# Patient Record
Sex: MALE | Race: WHITE | NOT HISPANIC OR LATINO | Employment: FULL TIME | URBAN - METROPOLITAN AREA
[De-identification: names, ages, dates, MRNs, and addresses within clinical notes are randomized per-mention and may not be internally consistent; named-entity substitution may affect disease eponyms.]

---

## 2021-02-19 ENCOUNTER — OFFICE VISIT (OUTPATIENT)
Dept: FAMILY MEDICINE CLINIC | Facility: CLINIC | Age: 26
End: 2021-02-19

## 2021-02-19 VITALS
HEART RATE: 58 BPM | RESPIRATION RATE: 20 BRPM | SYSTOLIC BLOOD PRESSURE: 110 MMHG | TEMPERATURE: 98.3 F | HEIGHT: 70 IN | OXYGEN SATURATION: 98 % | DIASTOLIC BLOOD PRESSURE: 60 MMHG | BODY MASS INDEX: 18.18 KG/M2 | WEIGHT: 127 LBS

## 2021-02-19 DIAGNOSIS — R55 SYNCOPE, UNSPECIFIED SYNCOPE TYPE: ICD-10-CM

## 2021-02-19 DIAGNOSIS — R63.4 UNEXPLAINED WEIGHT LOSS: ICD-10-CM

## 2021-02-19 DIAGNOSIS — R56.9 NEW ONSET SEIZURE WITHOUT HEAD TRAUMA (HCC): Primary | ICD-10-CM

## 2021-02-19 DIAGNOSIS — F17.200 TOBACCO USE DISORDER: ICD-10-CM

## 2021-02-19 LAB
ALBUMIN SERPL-MCNC: 4.9 G/DL (ref 3.5–5.2)
ALBUMIN/GLOB SERPL: 2 G/DL
ALP SERPL-CCNC: 77 U/L (ref 39–117)
ALT SERPL-CCNC: 17 U/L (ref 1–41)
AST SERPL-CCNC: 18 U/L (ref 1–40)
BASOPHILS # BLD AUTO: 0.05 10*3/MM3 (ref 0–0.2)
BASOPHILS NFR BLD AUTO: 1.1 % (ref 0–1.5)
BILIRUB SERPL-MCNC: 0.5 MG/DL (ref 0–1.2)
BUN SERPL-MCNC: 15 MG/DL (ref 6–20)
BUN/CREAT SERPL: 19 (ref 7–25)
CALCIUM SERPL-MCNC: 9.7 MG/DL (ref 8.6–10.5)
CHLORIDE SERPL-SCNC: 101 MMOL/L (ref 98–107)
CO2 SERPL-SCNC: 29.8 MMOL/L (ref 22–29)
CREAT SERPL-MCNC: 0.79 MG/DL (ref 0.76–1.27)
EOSINOPHIL # BLD AUTO: 0.24 10*3/MM3 (ref 0–0.4)
EOSINOPHIL NFR BLD AUTO: 5.4 % (ref 0.3–6.2)
ERYTHROCYTE [DISTWIDTH] IN BLOOD BY AUTOMATED COUNT: 11.9 % (ref 12.3–15.4)
GLOBULIN SER CALC-MCNC: 2.4 GM/DL
GLUCOSE SERPL-MCNC: 78 MG/DL (ref 65–99)
HCT VFR BLD AUTO: 47.9 % (ref 37.5–51)
HGB BLD-MCNC: 16.4 G/DL (ref 13–17.7)
IMM GRANULOCYTES # BLD AUTO: 0.09 10*3/MM3 (ref 0–0.05)
IMM GRANULOCYTES NFR BLD AUTO: 2 % (ref 0–0.5)
LYMPHOCYTES # BLD AUTO: 0.92 10*3/MM3 (ref 0.7–3.1)
LYMPHOCYTES NFR BLD AUTO: 20.5 % (ref 19.6–45.3)
MCH RBC QN AUTO: 31.7 PG (ref 26.6–33)
MCHC RBC AUTO-ENTMCNC: 34.2 G/DL (ref 31.5–35.7)
MCV RBC AUTO: 92.5 FL (ref 79–97)
MONOCYTES # BLD AUTO: 0.32 10*3/MM3 (ref 0.1–0.9)
MONOCYTES NFR BLD AUTO: 7.1 % (ref 5–12)
NEUTROPHILS # BLD AUTO: 2.86 10*3/MM3 (ref 1.7–7)
NEUTROPHILS NFR BLD AUTO: 63.9 % (ref 42.7–76)
NRBC BLD AUTO-RTO: 0 /100 WBC (ref 0–0.2)
PLATELET # BLD AUTO: 186 10*3/MM3 (ref 140–450)
POTASSIUM SERPL-SCNC: 4.3 MMOL/L (ref 3.5–5.2)
PROT SERPL-MCNC: 7.3 G/DL (ref 6–8.5)
RBC # BLD AUTO: 5.18 10*6/MM3 (ref 4.14–5.8)
SODIUM SERPL-SCNC: 141 MMOL/L (ref 136–145)
T4 FREE SERPL-MCNC: 1.17 NG/DL (ref 0.93–1.7)
TSH SERPL DL<=0.005 MIU/L-ACNC: 1.22 UIU/ML (ref 0.27–4.2)
WBC # BLD AUTO: 4.48 10*3/MM3 (ref 3.4–10.8)

## 2021-02-19 PROCEDURE — 99204 OFFICE O/P NEW MOD 45 MIN: CPT | Performed by: NURSE PRACTITIONER

## 2021-02-19 NOTE — PROGRESS NOTES
"Chief Complaint  NP / establish PCP (last seen by Dr. Hussein ) and Seizures (pt thinks it was on Wed. )    Subjective          Isidro Santos presents to [facility]  History of Present Illness     NP establish care  syncopal episode  Thinks she had a seizure on Wednesday  Bharat he was sitting in chair and took a drink, feeling really thirsty, started getting dizzy, then started falling over, laying over FPC on, very pale   She was in the room saying his name, ridgid in arms, pushed them out, bharat sat him up, then he came to, confused for a while then went to bed. Did not go to ER for evaluation.    Denies Alcohol or drug use; heavy tobacco abuse, \"chain smoking\" tobacco and vaping 5% nicotine, 1.5-2 packs per day; not ready to quit, has never taken medication to quit  No Hx of seizures in the past  No recent head injury, no weakness, no vision changes  Has a hx of anxiety and depression, went to counselor a few times in high school   [employer] has a very physical job loading trucks,   Eats a lot of Ramen noodles, drinks a lot of soda.  Weight loss, 127 today 140 previous unexplained, not trying to lose weight    Objective      Vital Signs:   /60   Pulse 58   Temp 98.3 °F (36.8 °C)   Resp 20   Ht 177.8 cm (70\")   Wt 57.6 kg (127 lb)   SpO2 98%   BMI 18.22 kg/m²     Physical Exam  Vitals signs and nursing note reviewed.   Constitutional:       General: He is not in acute distress.     Appearance: Normal appearance. He is well-developed.   HENT:      Head: Normocephalic.      Right Ear: Tympanic membrane, ear canal and external ear normal.      Left Ear: Tympanic membrane, ear canal and external ear normal.      Nose: Nose normal. No mucosal edema or rhinorrhea.      Right Sinus: No maxillary sinus tenderness or frontal sinus tenderness.      Left Sinus: No maxillary sinus tenderness or frontal sinus tenderness.      Mouth/Throat:      Pharynx: Uvula midline. "   Eyes:      General: Lids are normal.      Conjunctiva/sclera: Conjunctivae normal.      Pupils: Pupils are equal, round, and reactive to light.   Neck:      Musculoskeletal: Normal range of motion and neck supple.      Thyroid: No thyroid mass or thyromegaly.      Vascular: No carotid bruit or JVD.      Trachea: Trachea normal.   Cardiovascular:      Rate and Rhythm: Normal rate and regular rhythm.      Heart sounds: Normal heart sounds, S1 normal and S2 normal.   Pulmonary:      Effort: Pulmonary effort is normal. No respiratory distress.      Breath sounds: Normal breath sounds.   Abdominal:      General: Bowel sounds are normal.      Palpations: Abdomen is soft.      Tenderness: There is no abdominal tenderness.   Musculoskeletal: Normal range of motion.   Lymphadenopathy:      Head:      Right side of head: No tonsillar, preauricular, posterior auricular or occipital adenopathy.      Left side of head: No tonsillar, preauricular, posterior auricular or occipital adenopathy.      Cervical: No cervical adenopathy.   Skin:     General: Skin is warm and dry.      Capillary Refill: Capillary refill takes less than 2 seconds.      Findings: No rash.      Nails: There is no clubbing.     Neurological:      Mental Status: He is alert and oriented to person, place, and time.      Cranial Nerves: No cranial nerve deficit.      Deep Tendon Reflexes: Reflexes are normal and symmetric. Reflexes normal.   Psychiatric:         Mood and Affect: Mood normal.         Speech: Speech normal.         Behavior: Behavior normal.         Thought Content: Thought content normal.         Judgment: Judgment normal.        Result Review :                 Assessment and Plan    Diagnoses and all orders for this visit:    1. New onset seizure without head trauma (CMS/HCC) (Primary)  -     TSH  -     CBC & Differential  -     Comprehensive Metabolic Panel  -     T4, Free  -     Ambulatory Referral to Neurology    2. Unexplained weight  loss  -     TSH  -     CBC & Differential  -     Comprehensive Metabolic Panel  -     T4, Free    3. Tobacco use disorder    4. Syncope, unspecified syncope type  -     Ambulatory Referral to Neurology        Follow Up   No follow-ups on file.  Patient was given instructions and counseling regarding his condition or for health maintenance advice. Please see specific information pulled into the AVS if appropriate.   Will check labs and place referral to Neurology for further evaluation. If have another seizure or syncopal episode needs to go to ER. Pt agrees.

## 2021-02-24 ENCOUNTER — TELEPHONE (OUTPATIENT)
Dept: FAMILY MEDICINE CLINIC | Facility: CLINIC | Age: 26
End: 2021-02-24

## 2021-02-24 RX ORDER — ONDANSETRON 4 MG/1
4 TABLET, ORALLY DISINTEGRATING ORAL EVERY 8 HOURS PRN
Qty: 20 TABLET | Refills: 0 | Status: SHIPPED | OUTPATIENT
Start: 2021-02-24 | End: 2021-08-09

## 2021-02-24 NOTE — TELEPHONE ENCOUNTER
PT JESSICA CALLED TO REQUEST A CT SCAN FOR PT, PT WAS SEEN IN THE ER LAST NIGHT FOR STOMACH PROBLEMS, PT HAS BEEN THROWING UP GREEN STUFF.    PLEASE ADVISE.  CALL BACK:[phone]

## 2021-08-03 ENCOUNTER — TELEPHONE (OUTPATIENT)
Dept: NEUROLOGY | Facility: CLINIC | Age: 26
End: 2021-08-03

## 2021-08-03 NOTE — TELEPHONE ENCOUNTER
Caller: RONA     Relationship: MOM PT GAVE VERBAL TO SPEAK WITH HER ON ALL MEDICAL AND APPT.     Best call back number:[phone]    What was the call regarding: YAMIL     Do you require a callback: YES     PT MOM CALLED TO SEE ABOUT GETTING A SOONER APPT. SHE IS A [employer] EMPLOYEE AND IS SCARED TO DEATH SINCE THIS IS ALL NEW . HE HAS NEVER HAD SEIZURES BEFORE. WOULD LIKE TO GET IN ASAP.     PLEASE CALL AND ADVISE.

## 2021-08-04 NOTE — TELEPHONE ENCOUNTER
Unfortunately right now we do not have any sooner appointments.  I would recommend adding him to the cancellation list for both providers in our clinic.  The other option would be to schedule him with one of the [facility] HALLIE's because they may have more availability.  It does appear that his seizure started in February and they actually canceled a February appointment with HALLIE Keller.  I would see if there is sooner availability at [facility] and if not he can go on a wait list for any cancellations.  Thanks, HALLIE Nicole.

## 2021-08-06 NOTE — TELEPHONE ENCOUNTER
Spoke to Mom and she did go ahead and take a sooner appt at [facility] with one of the other NP's.

## 2021-08-09 ENCOUNTER — OFFICE VISIT (OUTPATIENT)
Dept: NEUROLOGY | Facility: CLINIC | Age: 26
End: 2021-08-09

## 2021-08-09 ENCOUNTER — LAB (OUTPATIENT)
Dept: LAB | Facility: HOSPITAL | Age: 26
End: 2021-08-09

## 2021-08-09 VITALS
WEIGHT: 129.7 LBS | DIASTOLIC BLOOD PRESSURE: 88 MMHG | HEIGHT: 70 IN | BODY MASS INDEX: 18.57 KG/M2 | OXYGEN SATURATION: 98 % | SYSTOLIC BLOOD PRESSURE: 124 MMHG | HEART RATE: 79 BPM

## 2021-08-09 DIAGNOSIS — R56.9 NEW ONSET SEIZURE (HCC): ICD-10-CM

## 2021-08-09 DIAGNOSIS — R56.9 NEW ONSET SEIZURE (HCC): Primary | ICD-10-CM

## 2021-08-09 LAB
AMMONIA BLD-SCNC: 29 UMOL/L (ref 16–60)
BASOPHILS # BLD AUTO: 0.08 10*3/MM3 (ref 0–0.2)
BASOPHILS NFR BLD AUTO: 1.4 % (ref 0–1.5)
DEPRECATED RDW RBC AUTO: 39.8 FL (ref 37–54)
EOSINOPHIL # BLD AUTO: 0.32 10*3/MM3 (ref 0–0.4)
EOSINOPHIL NFR BLD AUTO: 5.7 % (ref 0.3–6.2)
ERYTHROCYTE [DISTWIDTH] IN BLOOD BY AUTOMATED COUNT: 12 % (ref 12.3–15.4)
HCT VFR BLD AUTO: 43.4 % (ref 37.5–51)
HGB BLD-MCNC: 14.9 G/DL (ref 13–17.7)
IMM GRANULOCYTES # BLD AUTO: 0.02 10*3/MM3 (ref 0–0.05)
IMM GRANULOCYTES NFR BLD AUTO: 0.4 % (ref 0–0.5)
LYMPHOCYTES # BLD AUTO: 1.82 10*3/MM3 (ref 0.7–3.1)
LYMPHOCYTES NFR BLD AUTO: 32.6 % (ref 19.6–45.3)
MCH RBC QN AUTO: 31.6 PG (ref 26.6–33)
MCHC RBC AUTO-ENTMCNC: 34.3 G/DL (ref 31.5–35.7)
MCV RBC AUTO: 92.1 FL (ref 79–97)
MONOCYTES # BLD AUTO: 0.45 10*3/MM3 (ref 0.1–0.9)
MONOCYTES NFR BLD AUTO: 8.1 % (ref 5–12)
NEUTROPHILS NFR BLD AUTO: 2.9 10*3/MM3 (ref 1.7–7)
NEUTROPHILS NFR BLD AUTO: 51.8 % (ref 42.7–76)
NRBC BLD AUTO-RTO: 0 /100 WBC (ref 0–0.2)
PLATELET # BLD AUTO: 196 10*3/MM3 (ref 140–450)
PMV BLD AUTO: 10.8 FL (ref 6–12)
RBC # BLD AUTO: 4.71 10*6/MM3 (ref 4.14–5.8)
WBC # BLD AUTO: 5.59 10*3/MM3 (ref 3.4–10.8)

## 2021-08-09 PROCEDURE — 82607 VITAMIN B-12: CPT

## 2021-08-09 PROCEDURE — 82140 ASSAY OF AMMONIA: CPT

## 2021-08-09 PROCEDURE — 36415 COLL VENOUS BLD VENIPUNCTURE: CPT

## 2021-08-09 PROCEDURE — 99214 OFFICE O/P EST MOD 30 MIN: CPT | Performed by: NURSE PRACTITIONER

## 2021-08-09 PROCEDURE — 84443 ASSAY THYROID STIM HORMONE: CPT

## 2021-08-09 PROCEDURE — 80053 COMPREHEN METABOLIC PANEL: CPT

## 2021-08-09 PROCEDURE — 85025 COMPLETE CBC W/AUTO DIFF WBC: CPT

## 2021-08-09 RX ORDER — LEVETIRACETAM 500 MG/1
500 TABLET ORAL 2 TIMES DAILY
Qty: 60 TABLET | Refills: 2 | Status: SHIPPED | OUTPATIENT
Start: 2021-08-09 | End: 2021-10-04 | Stop reason: SINTOL
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
## 2021-08-10 ENCOUNTER — TELEPHONE (OUTPATIENT)
Dept: NEUROLOGY | Facility: CLINIC | Age: 26
End: 2021-08-10

## 2021-08-10 LAB
ALBUMIN SERPL-MCNC: 4.5 G/DL (ref 3.5–5.2)
ALBUMIN/GLOB SERPL: 2 G/DL
ALP SERPL-CCNC: 74 U/L (ref 39–117)
ALT SERPL W P-5'-P-CCNC: 15 U/L (ref 1–41)
ANION GAP SERPL CALCULATED.3IONS-SCNC: 9.9 MMOL/L (ref 5–15)
AST SERPL-CCNC: 14 U/L (ref 1–40)
BILIRUB SERPL-MCNC: 0.3 MG/DL (ref 0–1.2)
BUN SERPL-MCNC: 13 MG/DL (ref 6–20)
BUN/CREAT SERPL: 18.3 (ref 7–25)
CALCIUM SPEC-SCNC: 9.3 MG/DL (ref 8.6–10.5)
CHLORIDE SERPL-SCNC: 101 MMOL/L (ref 98–107)
CO2 SERPL-SCNC: 27.1 MMOL/L (ref 22–29)
CREAT SERPL-MCNC: 0.71 MG/DL (ref 0.76–1.27)
GFR SERPL CREATININE-BSD FRML MDRD: 136 ML/MIN/1.73
GLOBULIN UR ELPH-MCNC: 2.3 GM/DL
GLUCOSE SERPL-MCNC: 55 MG/DL (ref 65–99)
POTASSIUM SERPL-SCNC: 4.1 MMOL/L (ref 3.5–5.2)
PROT SERPL-MCNC: 6.8 G/DL (ref 6–8.5)
SODIUM SERPL-SCNC: 138 MMOL/L (ref 136–145)
TSH SERPL DL<=0.05 MIU/L-ACNC: 2.41 UIU/ML (ref 0.27–4.2)
VIT B12 BLD-MCNC: 433 PG/ML (ref 211–946)

## 2021-08-10 NOTE — TELEPHONE ENCOUNTER
----- Message from Yamile Chester, NELLI, APRN sent at 8/10/2021 11:05 AM EDT -----  Please let him know his labs were normal except his glucose was slightly low. He needs to make sure he is eating healthy meals on a regular basis.

## 2021-09-07 ENCOUNTER — TELEPHONE (OUTPATIENT)
Dept: NEUROLOGY | Facility: CLINIC | Age: 26
End: 2021-09-07

## 2021-09-08 ENCOUNTER — HOSPITAL ENCOUNTER (OUTPATIENT)
Dept: NEUROLOGY | Facility: HOSPITAL | Age: 26
Discharge: HOME | End: 2021-09-08

## 2021-09-08 ENCOUNTER — HOSPITAL ENCOUNTER (OUTPATIENT)
Dept: MRI IMAGING | Facility: HOSPITAL | Age: 26
Discharge: HOME | End: 2021-09-08

## 2021-09-08 DIAGNOSIS — R56.9 NEW ONSET SEIZURE (HCC): ICD-10-CM

## 2021-09-08 PROCEDURE — A9577 INJ MULTIHANCE: HCPCS | Performed by: NURSE PRACTITIONER

## 2021-09-08 PROCEDURE — 0 GADOBENATE DIMEGLUMINE 529 MG/ML SOLUTION: Performed by: NURSE PRACTITIONER

## 2021-09-08 PROCEDURE — 95819 EEG AWAKE AND ASLEEP: CPT

## 2021-09-08 PROCEDURE — 70553 MRI BRAIN STEM W/O & W/DYE: CPT

## 2021-09-08 RX ADMIN — GADOBENATE DIMEGLUMINE 10 ML: 529 INJECTION, SOLUTION INTRAVENOUS at 07:18

## 2021-09-09 ENCOUNTER — TELEPHONE (OUTPATIENT)
Dept: NEUROLOGY | Facility: CLINIC | Age: 26
End: 2021-09-09

## 2021-09-09 DIAGNOSIS — R90.89 ABNORMAL FINDING ON MRI OF BRAIN: Primary | ICD-10-CM

## 2021-09-09 DIAGNOSIS — R56.9 NEW ONSET SEIZURE (HCC): ICD-10-CM

## 2021-09-09 NOTE — TELEPHONE ENCOUNTER
Left message for patient to call back regarding MRI brain and EEG. MRI brain with abnormal finding in grey matter. EEG consistent with seizure. Continue current meds at this time. Repeat MRI in 3 months. Keep appt in October.

## 2021-10-04 ENCOUNTER — OFFICE VISIT (OUTPATIENT)
Dept: NEUROLOGY | Facility: CLINIC | Age: 26
End: 2021-10-04

## 2021-10-04 VITALS
BODY MASS INDEX: 17.79 KG/M2 | TEMPERATURE: 97.7 F | HEART RATE: 67 BPM | DIASTOLIC BLOOD PRESSURE: 62 MMHG | OXYGEN SATURATION: 96 % | HEIGHT: 70 IN | WEIGHT: 124.3 LBS | SYSTOLIC BLOOD PRESSURE: 110 MMHG

## 2021-10-04 DIAGNOSIS — R56.9 NEW ONSET SEIZURE (HCC): Primary | ICD-10-CM

## 2021-10-04 PROCEDURE — 99214 OFFICE O/P EST MOD 30 MIN: CPT | Performed by: NURSE PRACTITIONER

## 2021-10-04 RX ORDER — DIVALPROEX SODIUM 500 MG/1
TABLET, EXTENDED RELEASE ORAL
Qty: 60 TABLET | Refills: 10 | Status: SHIPPED | OUTPATIENT
Start: 2021-10-04 | End: 2022-10-05 | Stop reason: SDUPTHER

## 2021-10-04 NOTE — PROGRESS NOTES
Neuro Office Visit      Encounter Date: 10/04/2021   Patient Name: Meet Santos  : 1995   MRN: [id number]   PCP: Dr Lance  Chief Complaint:    Chief Complaint   Patient presents with   • Seizures       History of Present Illness: Meet Santos is a 26 y.o. male who is here today in Neurology for Seizures     Last appt 21 w me-labs, eeg, mri, start Keppra    Labs-NCS except for low Glucose at 55.  MRI-IMPRESSION:MRI Brain With & Without Contrast (2021 07:42)    Abnormal nodular gray matter along the posterior body,  occipital horn and temporal horn of the left lateral ventricle  compatible with subependymal gray matter heterotopias. Findings likely  represent epileptogenic focus. No additional evidence of ischemia, mass,  mass effect or hemorrhage. No abnormal enhancement.    EEGEEG (Hospital Performed) (2021 08:30)      SUMMARY:     Intermittent left temporal slow     Ill-defined broad-based left temporal sharp waves     IMPRESSION:     This EEG shows evidence for left temporal dysfunction which appears mild     This finding, in combination with the sharp waves noted, can be seen in the setting of seizure disorders of the partial type    Sz  No further seizures. Bharat reports he jerks in the night, feels tired in the morning. Has been very irritable on Keppra. Would like to change medications.    Problem History  First seizure in 2021. Sitting at desk, felt dizzy, rigid and fell out of chair. No jerking. Eye rolling, no tongue biting. No bladder incontinence. Duration less than 5 minutes. [place] confused after ferreira. Did not go to ER  MRI: nodular lesions left post ventricle  EEG: Left temporal sharp waves    Subjective      Past Medical History:   Past Medical History:   Diagnosis Date   • Seizures (HCC)        Past Surgical History:   Past Surgical History:   Procedure Laterality Date   • NO PAST SURGERIES         Family History:   Family History   Problem Relation Age  of Onset   • No Known Problems Mother         RN   • Heart disease Father    • No Known Problems Brother    • Asthma Maternal Grandmother    • No Known Problems Maternal Grandfather        Social History:   Social History     Socioeconomic History   • Marital status: Single     Spouse name: Not on file   • Number of children: Not on file   • Years of education: Not on file   • Highest education level: Not on file   Tobacco Use   • Smoking status: Current Every Day Smoker     Years: 5.00   • Smokeless tobacco: Never Used   • Tobacco comment: vaping currently    Vaping Use   • Vaping Use: Every day   Substance and Sexual Activity   • Alcohol use: Yes     Alcohol/week: 6.0 standard drinks     Types: 6 Shots of liquor per week   • Drug use: Yes     Types: Marijuana   • Sexual activity: Defer       Medications:     Current Outpatient Medications:   •  divalproex (Depakote ER) 500 MG 24 hr tablet, Take one tablet by mouth at bedtime for 7 days, then take 2 tabs at bedtime as directed., Disp: 60 tablet, Rfl: 10    Allergies:   No Known Allergies    PHQ-9 Total Score:     STEADI Fall Risk Assessment has not been completed.    Objective     Physical Exam:   Physical Exam  Eyes:      Pupils: Pupils are equal, round, and reactive to light.   Neurological:      Mental Status: He is oriented to person, place, and time.      Coordination: Finger-Nose-Finger Test, Heel to Shin Test and Romberg Test normal.      Gait: Gait is intact.      Deep Tendon Reflexes:      Reflex Scores:       Tricep reflexes are 2+ on the right side and 2+ on the left side.       Bicep reflexes are 2+ on the right side and 2+ on the left side.       Brachioradialis reflexes are 2+ on the right side and 2+ on the left side.       Patellar reflexes are 2+ on the right side and 2+ on the left side.       Achilles reflexes are 2+ on the right side and 2+ on the left side.  Psychiatric:         Speech: Speech normal.         Neurologic Exam     Mental Status  "  Oriented to person, place, and time.   Follows 3 step commands.   Attention: normal. Concentration: normal.   Speech: speech is normal   Level of consciousness: alert  Knowledge: consistent with education.   Normal comprehension.     Cranial Nerves     CN III, IV, VI   Pupils are equal, round, and reactive to light.  Right pupil: Accommodation: intact.   Left pupil: Accommodation: intact.   CN III: no CN III palsy  CN VI: no CN VI palsy  Nystagmus: none   Diplopia: none  Upgaze: normal  Downgaze: normal  Conjugate gaze: present    CN VII   Facial expression full, symmetric.     CN VIII   Hearing: intact    CN XII   CN XII normal.   Occasional ptosis. Occurs bilat but in one eye at a time. Right cover uncover is +     Motor Exam   Muscle bulk: normal  Overall muscle tone: normal    Strength   Right biceps: 5/5  Left biceps: 5/5  Right triceps: 5/5  Left triceps: 5/5  Right interossei: 5/5  Left interossei: 5/5  Right quadriceps: 5/5  Left quadriceps: 5/5  Right anterior tibial: 5/5  Left anterior tibial: 5/5  Right posterior tibial: 5/5  Left posterior tibial: 5/5    Sensory Exam   Light touch normal.     Gait, Coordination, and Reflexes     Gait  Gait: normal    Coordination   Romberg: negative  Finger to nose coordination: normal  Heel to shin coordination: normal    Tremor   Resting tremor: absent  Intention tremor: present  Action tremor: absent    Reflexes   Right brachioradialis: 2+  Left brachioradialis: 2+  Right biceps: 2+  Left biceps: 2+  Right triceps: 2+  Left triceps: 2+  Right patellar: 2+  Left patellar: 2+  Right achilles: 2+  Left achilles: 2+  Right : 2+  Left : 2+       Vital Signs:   Vitals:    10/04/21 1008   BP: 110/62   Pulse: 67   Temp: 97.7 °F (36.5 °C)   SpO2: 96%   Weight: 56.4 kg (124 lb 4.8 oz)   Height: 177.8 cm (70\")     Body mass index is 17.84 kg/m².     Results:   Imaging:   EEG (Hospital Performed)    Result Date: 9/8/2021  This EEG shows evidence for left temporal " dysfunction which appears mild This finding, in combination with the sharp waves noted, can be seen in the setting of seizure disorders of the partial type This report is transcribed using the Dragon dictation system.      MRI Brain With & Without Contrast    Result Date: 9/8/2021  Abnormal nodular gray matter along the posterior body, occipital horn and temporal horn of the left lateral ventricle compatible with subependymal gray matter heterotopias. Findings likely represent epileptogenic focus. No additional evidence of ischemia, mass, mass effect or hemorrhage. No abnormal enhancement.   This report was finalized on 9/8/2021 9:12 AM by Roger Kennedy.         Assessment / Plan      Assessment/Plan:   Diagnoses and all orders for this visit:    1. New onset seizure (HCC) (Primary)  -     divalproex (Depakote ER) 500 MG 24 hr tablet; Take one tablet by mouth at bedtime for 7 days, then take 2 tabs at bedtime as directed.  Dispense: 60 tablet; Refill: 10     Will wean Keppra. Follow up for labs in 6 weeks. Call sooner prn.  I recommend he be off work until seizure free 90 days.    Patient Education:   I have instructed and given the patient education on the importance of not driving and operating heavy machinery. No solo bathing or tub baths for 3 months from onset of the most recent seizure.   Minimize stress as much as possible. I have recommended 7-8 hours of sleep each night. Abstain from alcohol intake. Educated on Antiepileptic medications with possible side effects and signs and symptoms to report if prescribed during visit. Instructed to take seizure medication daily if prescribed. Reviewed potential seizure risk factors.   I have instructed the patient to call 911 or our office if another seizure does occur. Patient verbalizes and understands.       Reviewed medications, potential side effects and signs and symptoms to report. Discussed risk versus benefits of treatment plan with patient and/or  family-including medications, labs and radiology that may be ordered. Addressed questions and concerns during visit. Patient and/or family verbalized understanding and agree with plan. Instructed to call the office with any questions and report to ER with any life-threatening symptoms.     Follow Up:   Return in about 6 weeks (around 11/15/2021) for Recheck.    During this visit the following were done:  Labs Reviewed [x]    Labs Ordered []    Radiology Reports Reviewed []    Radiology Ordered []    PCP Records Reviewed []    Referring Provider Records Reviewed []    ER Records Reviewed []    Hospital Records Reviewed []    History Obtained From Family []    Radiology Images Reviewed []    Other Reviewed [x]    Records Requested []      Yamile Chester, DNP, APRN

## 2021-11-15 ENCOUNTER — OFFICE VISIT (OUTPATIENT)
Dept: NEUROLOGY | Facility: CLINIC | Age: 26
End: 2021-11-15

## 2021-11-15 ENCOUNTER — LAB (OUTPATIENT)
Dept: LAB | Facility: HOSPITAL | Age: 26
End: 2021-11-15

## 2021-11-15 VITALS
TEMPERATURE: 98.6 F | OXYGEN SATURATION: 97 % | WEIGHT: 125.2 LBS | DIASTOLIC BLOOD PRESSURE: 72 MMHG | HEIGHT: 70 IN | BODY MASS INDEX: 17.92 KG/M2 | HEART RATE: 79 BPM | SYSTOLIC BLOOD PRESSURE: 116 MMHG

## 2021-11-15 DIAGNOSIS — G40.109 LOCALIZATION-RELATED SYMPTOMATIC EPILEPSY AND EPILEPTIC SYNDROMES WITH SIMPLE PARTIAL SEIZURES, NOT INTRACTABLE, WITHOUT STATUS EPILEPTICUS (HCC): Primary | ICD-10-CM

## 2021-11-15 DIAGNOSIS — R56.9 NEW ONSET SEIZURE (HCC): ICD-10-CM

## 2021-11-15 DIAGNOSIS — R90.89 ABNORMAL FINDING ON MRI OF BRAIN: ICD-10-CM

## 2021-11-15 LAB
ALBUMIN SERPL-MCNC: 5 G/DL (ref 3.5–5.2)
ALBUMIN/GLOB SERPL: 2.2 G/DL
ALP SERPL-CCNC: 74 U/L (ref 39–117)
ALT SERPL W P-5'-P-CCNC: 14 U/L (ref 1–41)
ANION GAP SERPL CALCULATED.3IONS-SCNC: 10.2 MMOL/L (ref 5–15)
AST SERPL-CCNC: 18 U/L (ref 1–40)
BASOPHILS # BLD AUTO: 0.07 10*3/MM3 (ref 0–0.2)
BASOPHILS NFR BLD AUTO: 0.9 % (ref 0–1.5)
BILIRUB SERPL-MCNC: 0.3 MG/DL (ref 0–1.2)
BUN SERPL-MCNC: 15 MG/DL (ref 6–20)
BUN/CREAT SERPL: 17.9 (ref 7–25)
CALCIUM SPEC-SCNC: 9.7 MG/DL (ref 8.6–10.5)
CHLORIDE SERPL-SCNC: 103 MMOL/L (ref 98–107)
CO2 SERPL-SCNC: 28.8 MMOL/L (ref 22–29)
CREAT SERPL-MCNC: 0.84 MG/DL (ref 0.76–1.27)
DEPRECATED RDW RBC AUTO: 41.6 FL (ref 37–54)
EOSINOPHIL # BLD AUTO: 0.32 10*3/MM3 (ref 0–0.4)
EOSINOPHIL NFR BLD AUTO: 4.3 % (ref 0.3–6.2)
ERYTHROCYTE [DISTWIDTH] IN BLOOD BY AUTOMATED COUNT: 12.1 % (ref 12.3–15.4)
GFR SERPL CREATININE-BSD FRML MDRD: 112 ML/MIN/1.73
GLOBULIN UR ELPH-MCNC: 2.3 GM/DL
GLUCOSE SERPL-MCNC: 76 MG/DL (ref 65–99)
HCT VFR BLD AUTO: 47.7 % (ref 37.5–51)
HGB BLD-MCNC: 16.2 G/DL (ref 13–17.7)
IMM GRANULOCYTES # BLD AUTO: 0.02 10*3/MM3 (ref 0–0.05)
IMM GRANULOCYTES NFR BLD AUTO: 0.3 % (ref 0–0.5)
LYMPHOCYTES # BLD AUTO: 2.2 10*3/MM3 (ref 0.7–3.1)
LYMPHOCYTES NFR BLD AUTO: 29.7 % (ref 19.6–45.3)
MCH RBC QN AUTO: 31.6 PG (ref 26.6–33)
MCHC RBC AUTO-ENTMCNC: 34 G/DL (ref 31.5–35.7)
MCV RBC AUTO: 93 FL (ref 79–97)
MONOCYTES # BLD AUTO: 0.74 10*3/MM3 (ref 0.1–0.9)
MONOCYTES NFR BLD AUTO: 10 % (ref 5–12)
NEUTROPHILS NFR BLD AUTO: 4.05 10*3/MM3 (ref 1.7–7)
NEUTROPHILS NFR BLD AUTO: 54.8 % (ref 42.7–76)
NRBC BLD AUTO-RTO: 0 /100 WBC (ref 0–0.2)
PLATELET # BLD AUTO: 188 10*3/MM3 (ref 140–450)
PMV BLD AUTO: 11.4 FL (ref 6–12)
POTASSIUM SERPL-SCNC: 5 MMOL/L (ref 3.5–5.2)
PROT SERPL-MCNC: 7.3 G/DL (ref 6–8.5)
RBC # BLD AUTO: 5.13 10*6/MM3 (ref 4.14–5.8)
SODIUM SERPL-SCNC: 142 MMOL/L (ref 136–145)
VALPROATE SERPL-MCNC: <2.8 MCG/ML (ref 50–125)
WBC # BLD AUTO: 7.4 10*3/MM3 (ref 3.4–10.8)

## 2021-11-15 PROCEDURE — 85025 COMPLETE CBC W/AUTO DIFF WBC: CPT

## 2021-11-15 PROCEDURE — 36415 COLL VENOUS BLD VENIPUNCTURE: CPT

## 2021-11-15 PROCEDURE — 80053 COMPREHEN METABOLIC PANEL: CPT

## 2021-11-15 PROCEDURE — 99214 OFFICE O/P EST MOD 30 MIN: CPT | Performed by: NURSE PRACTITIONER

## 2021-11-15 PROCEDURE — 80164 ASSAY DIPROPYLACETIC ACD TOT: CPT

## 2021-11-15 NOTE — PROGRESS NOTES
Neuro Office Visit      Encounter Date: 11/15/2021   Patient Name: Meet Santos  : 1995   MRN: [id number]     Chief Complaint:    Chief Complaint   Patient presents with   • Seizures       History of Present Illness: Meet Santos is a 26 y.o. male who is here today in Neurology for seizures.    Last appt 10/04/21 w me-Depakote ER 500mg. Wean Keppra. Repeat MRI in Dec 2021.    Sz  No Sz since last visit.  Had Seizure 2021. Weaned off of Keppra due to irritability. Taking Depakote. 500mg 2 q hs.        MRI Brain With & Without Contrast (2021 07:42)  IMPRESSION:  Abnormal nodular gray matter along the posterior body,  occipital horn and temporal horn of the left lateral ventricle  compatible with subependymal gray matter heterotopias. Findings likely  represent epileptogenic focus. No additional evidence of ischemia, mass,  mass effect or hemorrhage. No abnormal enhancement.      EEG (Hospital Performed) (2021 08:30)  IMPRESSION:  This EEG shows evidence for left temporal dysfunction which appears mild  This finding, in combination with the sharp waves noted, can be seen in the setting of seizure disorders of the partial type    Subjective      Past Medical History:   Past Medical History:   Diagnosis Date   • Seizures (HCC)        Past Surgical History:   Past Surgical History:   Procedure Laterality Date   • NO PAST SURGERIES         Family History:   Family History   Problem Relation Age of Onset   • No Known Problems Mother         RN   • Heart disease Father    • No Known Problems Brother    • Asthma Maternal Grandmother    • No Known Problems Maternal Grandfather        Social History:   Social History     Socioeconomic History   • Marital status: Single   Tobacco Use   • Smoking status: Current Every Day Smoker     Years: 5.00   • Smokeless tobacco: Never Used   • Tobacco comment: vaping currently    Vaping Use   • Vaping Use: Every day   Substance and Sexual  Activity   • Alcohol use: Yes     Alcohol/week: 6.0 standard drinks     Types: 6 Shots of liquor per week   • Drug use: Yes     Types: Marijuana   • Sexual activity: Defer       Medications:     Current Outpatient Medications:   •  divalproex (Depakote ER) 500 MG 24 hr tablet, Take one tablet by mouth at bedtime for 7 days, then take 2 tabs at bedtime as directed., Disp: 60 tablet, Rfl: 10    Allergies:   No Known Allergies    PHQ-9 Total Score:     STEADI Fall Risk Assessment has not been completed.    Objective     Physical Exam:   Physical Exam  Eyes:      Pupils: Pupils are equal, round, and reactive to light.   Neurological:      Mental Status: He is oriented to person, place, and time.      Coordination: Finger-Nose-Finger Test, Heel to Shin Test and Romberg Test normal.      Gait: Gait is intact.      Deep Tendon Reflexes:      Reflex Scores:       Tricep reflexes are 2+ on the right side and 2+ on the left side.       Bicep reflexes are 2+ on the right side and 2+ on the left side.       Brachioradialis reflexes are 2+ on the right side and 2+ on the left side.       Patellar reflexes are 2+ on the right side and 2+ on the left side.       Achilles reflexes are 2+ on the right side and 2+ on the left side.  Psychiatric:         Speech: Speech normal.         Neurologic Exam     Mental Status   Oriented to person, place, and time.   Follows 3 step commands.   Attention: normal. Concentration: normal.   Speech: speech is normal   Level of consciousness: alert  Knowledge: consistent with education.   Normal comprehension.     Cranial Nerves     CN III, IV, VI   Pupils are equal, round, and reactive to light.  Right pupil: Accommodation: intact.   Left pupil: Accommodation: intact.   CN III: no CN III palsy  CN VI: no CN VI palsy  Nystagmus: none   Diplopia: none  Upgaze: normal  Downgaze: normal  Conjugate gaze: present    CN VII   Facial expression full, symmetric.     CN VIII   Hearing: intact    CN XII   CN  "XII normal.     Motor Exam   Muscle bulk: normal  Overall muscle tone: normal    Strength   Right biceps: 5/5  Left biceps: 5/5  Right triceps: 5/5  Left triceps: 5/5  Right interossei: 5/5  Left interossei: 5/5  Right quadriceps: 5/5  Left quadriceps: 5/5  Right anterior tibial: 5/5  Left anterior tibial: 5/5  Right posterior tibial: 5/5  Left posterior tibial: 5/5    Sensory Exam   Light touch normal.     Gait, Coordination, and Reflexes     Gait  Gait: normal    Coordination   Romberg: negative  Finger to nose coordination: normal  Heel to shin coordination: normal    Tremor   Resting tremor: absent  Action tremor: absent    Reflexes   Right brachioradialis: 2+  Left brachioradialis: 2+  Right biceps: 2+  Left biceps: 2+  Right triceps: 2+  Left triceps: 2+  Right patellar: 2+  Left patellar: 2+  Right achilles: 2+  Left achilles: 2+  Right : 2+  Left : 2+       Vital Signs:   Vitals:    11/15/21 0908   BP: 116/72   Pulse: 79   Temp: 98.6 °F (37 °C)   TempSrc: Temporal   SpO2: 97%   Weight: 56.8 kg (125 lb 3.2 oz)   Height: 177.8 cm (70\")     Body mass index is 17.96 kg/m².     Results:   Imaging:   EEG (Hospital Performed)    Result Date: 9/8/2021  This EEG shows evidence for left temporal dysfunction which appears mild This finding, in combination with the sharp waves noted, can be seen in the setting of seizure disorders of the partial type This report is transcribed using the Dragon dictation system.      MRI Brain With & Without Contrast    Result Date: 9/8/2021  Abnormal nodular gray matter along the posterior body, occipital horn and temporal horn of the left lateral ventricle compatible with subependymal gray matter heterotopias. Findings likely represent epileptogenic focus. No additional evidence of ischemia, mass, mass effect or hemorrhage. No abnormal enhancement.   This report was finalized on 9/8/2021 9:12 AM by Roger Kennedy.         Assessment / Plan      Assessment/Plan:   Diagnoses " and all orders for this visit:    1. Localization-related symptomatic epilepsy and epileptic syndromes with simple partial seizures, not intractable, without status epilepticus (HCC) (Primary)  -     Valproic Acid Level, Total; Future  -     CBC Auto Differential; Future  -     Comprehensive Metabolic Panel; Future    2. Abnormal finding on MRI of brain  Comments:  MRI brain ordered to be repeated after 12/14/21.         Patient Education:     Reviewed medications, potential side effects and signs and symptoms to report. Discussed risk versus benefits of treatment plan with patient and/or family-including medications, labs and radiology that may be ordered. Addressed questions and concerns during visit. Patient and/or family verbalized understanding and agree with plan. Instructed to call the office with any questions and report to ER with any life-threatening symptoms.     Follow Up:   Return in about 6 weeks (around 12/27/2021) for Recheck.    During this visit the following were done:  Labs Reviewed [x]    Labs Ordered [x]    Radiology Reports Reviewed [x]    Radiology Ordered []    PCP Records Reviewed []    Referring Provider Records Reviewed []    ER Records Reviewed []    Hospital Records Reviewed []    History Obtained From Family []    Radiology Images Reviewed [x]    Other Reviewed []    Records Requested []      Yamile Chester, NELLI, APRN

## 2021-12-02 ENCOUNTER — TELEPHONE (OUTPATIENT)
Dept: NEUROLOGY | Facility: CLINIC | Age: 26
End: 2021-12-02

## 2021-12-02 NOTE — TELEPHONE ENCOUNTER
Anthony Worrell,  Under the communications in her referral for a repeat MRI of the brain it states when they called to schedule patient refused stating she had already had this test done and did not want to do it again.  However,  Now the grandmother is calling about getting it scheduled. I know you wrote repeat after 12/14/21 in your last office note, but before I call the Grandmother wanted to clarify that you wanted the repeat done towards the end of this month due to previous abnormality? Insurance may require it be 6 months in between but we can see when they do the authorization.

## 2021-12-02 NOTE — TELEPHONE ENCOUNTER
Provider: HALLIE RICHARD  Caller: TASHA HILL  Relationship to Patient: PT'S GRANDMOTHER        Reason for Call: PT'S GRANDMOTHER TASHA CALLING CHECKING ON TO SEE IF THE MRI BRAIN HAD BEEN SCHEDULED YET, CAUSE THEY HAVE NOT HEARD FROM NO ONE.      PLEASE CALL HER BACK [phone]

## 2021-12-03 NOTE — TELEPHONE ENCOUNTER
FYI: Reasoning for repeat sooner than 6 months:  New onset of seizures with abnormal MRI, recheck MRI/repeat in 3 months for possible surgical intervention.     S/w Grandmother, Pao who states he does want this done and really needs it as well. Transferred her over to [facility] scheduling and routed referral back to them so she can go ahead and get him put on for this. Thanks!

## 2022-01-02 ENCOUNTER — HOSPITAL ENCOUNTER (OUTPATIENT)
Dept: MRI IMAGING | Facility: HOSPITAL | Age: 27
Discharge: HOME | End: 2022-01-02
Admitting: NURSE PRACTITIONER

## 2022-01-02 DIAGNOSIS — R56.9 NEW ONSET SEIZURE: ICD-10-CM

## 2022-01-02 DIAGNOSIS — R90.89 ABNORMAL FINDING ON MRI OF BRAIN: ICD-10-CM

## 2022-01-02 PROCEDURE — 0 GADOBENATE DIMEGLUMINE 529 MG/ML SOLUTION: Performed by: NURSE PRACTITIONER

## 2022-01-02 PROCEDURE — A9577 INJ MULTIHANCE: HCPCS | Performed by: NURSE PRACTITIONER

## 2022-01-02 PROCEDURE — 70553 MRI BRAIN STEM W/O & W/DYE: CPT

## 2022-01-02 RX ADMIN — GADOBENATE DIMEGLUMINE 10 ML: 529 INJECTION, SOLUTION INTRAVENOUS at 08:24

## 2022-01-07 ENCOUNTER — TELEPHONE (OUTPATIENT)
Dept: NEUROLOGY | Facility: CLINIC | Age: 27
End: 2022-01-07

## 2022-01-07 NOTE — TELEPHONE ENCOUNTER
----- Message from Yamile Chester DNP, APRN sent at 1/7/2022  2:49 PM EST -----  Please notify patient that MRI is stable. Continue current medication.

## 2022-01-07 NOTE — TELEPHONE ENCOUNTER
Spoke with Step-Dad to let him know the results of the MRI. He will pass information along to patient and mother. I will also send a [facility] message.

## 2022-01-21 ENCOUNTER — LAB (OUTPATIENT)
Dept: LAB | Facility: HOSPITAL | Age: 27
End: 2022-01-21

## 2022-01-21 ENCOUNTER — OFFICE VISIT (OUTPATIENT)
Dept: NEUROLOGY | Facility: CLINIC | Age: 27
End: 2022-01-21

## 2022-01-21 VITALS
HEART RATE: 74 BPM | WEIGHT: 133.8 LBS | RESPIRATION RATE: 14 BRPM | HEIGHT: 70 IN | DIASTOLIC BLOOD PRESSURE: 58 MMHG | TEMPERATURE: 97.8 F | OXYGEN SATURATION: 96 % | SYSTOLIC BLOOD PRESSURE: 118 MMHG | BODY MASS INDEX: 19.15 KG/M2

## 2022-01-21 DIAGNOSIS — R11.0 NAUSEA: ICD-10-CM

## 2022-01-21 DIAGNOSIS — M79.10 MYALGIA: ICD-10-CM

## 2022-01-21 DIAGNOSIS — G40.909 SEIZURE DISORDER: Primary | ICD-10-CM

## 2022-01-21 DIAGNOSIS — R56.9 NEW ONSET SEIZURE: ICD-10-CM

## 2022-01-21 LAB
ALBUMIN SERPL-MCNC: 5 G/DL (ref 3.5–5.2)
ALBUMIN/GLOB SERPL: 2 G/DL
ALP SERPL-CCNC: 73 U/L (ref 39–117)
ALT SERPL W P-5'-P-CCNC: 11 U/L (ref 1–41)
ANION GAP SERPL CALCULATED.3IONS-SCNC: 8 MMOL/L (ref 5–15)
AST SERPL-CCNC: 21 U/L (ref 1–40)
BASOPHILS # BLD AUTO: 0.06 10*3/MM3 (ref 0–0.2)
BASOPHILS NFR BLD AUTO: 1 % (ref 0–1.5)
BILIRUB SERPL-MCNC: 0.6 MG/DL (ref 0–1.2)
BUN SERPL-MCNC: 18 MG/DL (ref 6–20)
BUN/CREAT SERPL: 22.5 (ref 7–25)
CALCIUM SPEC-SCNC: 10.1 MG/DL (ref 8.6–10.5)
CHLORIDE SERPL-SCNC: 101 MMOL/L (ref 98–107)
CK SERPL-CCNC: 207 U/L (ref 20–200)
CO2 SERPL-SCNC: 31 MMOL/L (ref 22–29)
CREAT SERPL-MCNC: 0.8 MG/DL (ref 0.76–1.27)
DEPRECATED RDW RBC AUTO: 43.5 FL (ref 37–54)
EOSINOPHIL # BLD AUTO: 0.17 10*3/MM3 (ref 0–0.4)
EOSINOPHIL NFR BLD AUTO: 2.7 % (ref 0.3–6.2)
ERYTHROCYTE [DISTWIDTH] IN BLOOD BY AUTOMATED COUNT: 12.7 % (ref 12.3–15.4)
GFR SERPL CREATININE-BSD FRML MDRD: 119 ML/MIN/1.73
GLOBULIN UR ELPH-MCNC: 2.5 GM/DL
GLUCOSE SERPL-MCNC: 104 MG/DL (ref 65–99)
HCT VFR BLD AUTO: 47.5 % (ref 37.5–51)
HGB BLD-MCNC: 16.2 G/DL (ref 13–17.7)
IMM GRANULOCYTES # BLD AUTO: 0.02 10*3/MM3 (ref 0–0.05)
IMM GRANULOCYTES NFR BLD AUTO: 0.3 % (ref 0–0.5)
LYMPHOCYTES # BLD AUTO: 2.26 10*3/MM3 (ref 0.7–3.1)
LYMPHOCYTES NFR BLD AUTO: 35.8 % (ref 19.6–45.3)
MCH RBC QN AUTO: 32 PG (ref 26.6–33)
MCHC RBC AUTO-ENTMCNC: 34.1 G/DL (ref 31.5–35.7)
MCV RBC AUTO: 93.7 FL (ref 79–97)
MONOCYTES # BLD AUTO: 0.63 10*3/MM3 (ref 0.1–0.9)
MONOCYTES NFR BLD AUTO: 10 % (ref 5–12)
NEUTROPHILS NFR BLD AUTO: 3.17 10*3/MM3 (ref 1.7–7)
NEUTROPHILS NFR BLD AUTO: 50.2 % (ref 42.7–76)
NRBC BLD AUTO-RTO: 0 /100 WBC (ref 0–0.2)
PLATELET # BLD AUTO: 229 10*3/MM3 (ref 140–450)
PMV BLD AUTO: 10.7 FL (ref 6–12)
POTASSIUM SERPL-SCNC: 4.9 MMOL/L (ref 3.5–5.2)
PROT SERPL-MCNC: 7.5 G/DL (ref 6–8.5)
RBC # BLD AUTO: 5.07 10*6/MM3 (ref 4.14–5.8)
SODIUM SERPL-SCNC: 140 MMOL/L (ref 136–145)
VALPROATE SERPL-MCNC: 26 MCG/ML (ref 50–125)
WBC NRBC COR # BLD: 6.31 10*3/MM3 (ref 3.4–10.8)

## 2022-01-21 PROCEDURE — 99213 OFFICE O/P EST LOW 20 MIN: CPT | Performed by: NURSE PRACTITIONER

## 2022-01-21 PROCEDURE — 80053 COMPREHEN METABOLIC PANEL: CPT

## 2022-01-21 PROCEDURE — 36415 COLL VENOUS BLD VENIPUNCTURE: CPT

## 2022-01-21 PROCEDURE — 85025 COMPLETE CBC W/AUTO DIFF WBC: CPT

## 2022-01-21 PROCEDURE — 80164 ASSAY DIPROPYLACETIC ACD TOT: CPT

## 2022-01-21 PROCEDURE — 82550 ASSAY OF CK (CPK): CPT

## 2022-01-21 NOTE — PROGRESS NOTES
Neuro Office Visit      Encounter Date: 2022   Patient Name: Meet Santos  : 1995   MRN: [id number]     Chief Complaint:    Chief Complaint   Patient presents with   • Follow-up     6 week recheck - Localization-related symptomatic epilepsy and epileptic syndromes with simple partial seizures       History of Present Illness: Meet Santos is a 26 y.o. male who is here today in Neurology for seizures.       Last visit 11/15/21 w me-- labs, MRI brain.  MRI Brain With & Without Contrast (2022 08:33)-stable no change  Valproic acid- < 2.8, CMP, CBC-nml       Sz  Last seizure: 2021  Medication compliance: No missed doses.  Side effects: myalgia, nausea    Working at [email or web address] on night shift. Not getting enough sleep.  Decreased appetite.      Problem History  First seizure in 2021. Sitting at desk, felt dizzy, rigid and fell out of chair. No jerking. Eye rolling, no tongue biting. No bladder incontinence. Duration less than 5 minutes. [place] confused after ferreira. Did not go to ER  MRI: nodular lesions left post ventricle  EEG: Left temporal sharp waves  Meds: Keppra-irritability, Depakote ER 500mg    Subjective      Past Medical History:   Past Medical History:   Diagnosis Date   • Seizures (HCC)        Past Surgical History:   Past Surgical History:   Procedure Laterality Date   • NO PAST SURGERIES         Family History:   Family History   Problem Relation Age of Onset   • No Known Problems Mother         RN   • Heart disease Father    • No Known Problems Brother    • Asthma Maternal Grandmother    • No Known Problems Maternal Grandfather        Social History:   Social History     Socioeconomic History   • Marital status: Single   Tobacco Use   • Smoking status: Current Every Day Smoker     Years: 5.00   • Smokeless tobacco: Never Used   • Tobacco comment: vaping currently    Vaping Use   • Vaping Use: Every day   • Substances: Nicotine, Flavoring   Substance and Sexual  Activity   • Alcohol use: Yes     Alcohol/week: 6.0 standard drinks     Types: 6 Shots of liquor per week   • Drug use: Yes     Types: Marijuana   • Sexual activity: Defer       Medications:     Current Outpatient Medications:   •  divalproex (Depakote ER) 500 MG 24 hr tablet, Take one tablet by mouth at bedtime for 7 days, then take 2 tabs at bedtime as directed., Disp: 60 tablet, Rfl: 10    Allergies:   No Known Allergies    PHQ-9 Total Score:     STEADI Fall Risk Assessment has not been completed.    Objective     Physical Exam:   Physical Exam  Eyes:      Pupils: Pupils are equal, round, and reactive to light.   Neurological:      Mental Status: He is oriented to person, place, and time.      Coordination: Finger-Nose-Finger Test, Heel to Shin Test and Romberg Test normal.      Gait: Gait is intact.      Deep Tendon Reflexes:      Reflex Scores:       Tricep reflexes are 2+ on the right side and 2+ on the left side.       Bicep reflexes are 2+ on the right side and 2+ on the left side.       Brachioradialis reflexes are 2+ on the right side and 2+ on the left side.       Patellar reflexes are 2+ on the right side and 2+ on the left side.       Achilles reflexes are 2+ on the right side and 2+ on the left side.  Psychiatric:         Speech: Speech normal.         Neurologic Exam     Mental Status   Oriented to person, place, and time.   Follows 3 step commands.   Attention: normal. Concentration: normal.   Speech: speech is normal   Level of consciousness: alert  Knowledge: consistent with education.   Normal comprehension.     Cranial Nerves     CN III, IV, VI   Pupils are equal, round, and reactive to light.  Right pupil: Accommodation: intact.   Left pupil: Accommodation: intact.   CN III: no CN III palsy  CN VI: no CN VI palsy  Nystagmus: none   Diplopia: none  Upgaze: normal  Downgaze: normal  Conjugate gaze: present    CN VII   Facial expression full, symmetric.     CN VIII   Hearing: intact    CN XII   CN  "XII normal.     Motor Exam   Muscle bulk: normal  Overall muscle tone: normal    Strength   Right biceps: 5/5  Left biceps: 5/5  Right triceps: 5/5  Left triceps: 5/5  Right interossei: 5/5  Left interossei: 5/5  Right quadriceps: 5/5  Left quadriceps: 5/5  Right anterior tibial: 5/5  Left anterior tibial: 5/5  Right posterior tibial: 5/5  Left posterior tibial: 5/5    Sensory Exam   Light touch normal.     Gait, Coordination, and Reflexes     Gait  Gait: normal    Coordination   Romberg: negative  Finger to nose coordination: normal  Heel to shin coordination: normal    Tremor   Resting tremor: absent  Action tremor: absent    Reflexes   Right brachioradialis: 2+  Left brachioradialis: 2+  Right biceps: 2+  Left biceps: 2+  Right triceps: 2+  Left triceps: 2+  Right patellar: 2+  Left patellar: 2+  Right achilles: 2+  Left achilles: 2+  Right : 2+  Left : 2+       Vital Signs:   Vitals:    01/21/22 0808   BP: 118/58   Pulse: 74   Resp: 14   Temp: 97.8 °F (36.6 °C)   TempSrc: Infrared   SpO2: 96%   Weight: 60.7 kg (133 lb 12.8 oz)   Height: 177.8 cm (70\")     Body mass index is 19.2 kg/m².     Results:   Imaging:   MRI Brain With & Without Contrast    Result Date: 1/2/2022  Stable appearance of nodular gray matter heterotopia likely in the left lateral ventricle posterior horn predominantly as noted on prior comparison without abnormal enhancement of these areas or elsewhere and no cystic change with interval involvement of heterogeneous signal otherwise.  DICTATED:   01/02/2022 EDITED/lfs:   01/02/2022    This report was finalized on 1/2/2022 4:31 PM by Dr. Ivan Rodriguez.           Assessment / Plan      Assessment/Plan:   Diagnoses and all orders for this visit:    1. Seizure disorder (HCC) (Primary)  -     Valproic Acid Level, Total; Future    2. Myalgia  -     CK; Future    3. Nausea  -     CBC Auto Differential; Future  -     Comprehensive Metabolic Panel; Future           Patient Education:     Reviewed " medications, potential side effects and signs and symptoms to report. Discussed risk versus benefits of treatment plan with patient and/or family-including medications, labs and radiology that may be ordered. Addressed questions and concerns during visit. Patient and/or family verbalized understanding and agree with plan. Instructed to call the office with any questions and report to ER with any life-threatening symptoms.     Follow Up:   Return in about 6 months (around 7/21/2022) for Recheck.    During this visit the following were done:  Labs Reviewed [x]    Labs Ordered [x]    Radiology Reports Reviewed [x]    Radiology Ordered []    PCP Records Reviewed []    Referring Provider Records Reviewed []    ER Records Reviewed []    Hospital Records Reviewed []    History Obtained From Family []    Radiology Images Reviewed []    Other Reviewed []    Records Requested []      Yamile Chester, NELLI, APRN

## 2022-01-24 ENCOUNTER — TELEPHONE (OUTPATIENT)
Dept: NEUROLOGY | Facility: CLINIC | Age: 27
End: 2022-01-24

## 2022-01-24 NOTE — TELEPHONE ENCOUNTER
----- Message from Yamile Chester DNP, APRN sent at 1/24/2022  8:14 AM EST -----  Please notify pt his labs are stable.

## 2022-01-24 NOTE — TELEPHONE ENCOUNTER
I called 1x. Patient grandmother answered. She advised she would have him contact our office. Patient lab results are normal.   -TMT

## 2022-04-27 ENCOUNTER — OFFICE VISIT (OUTPATIENT)
Dept: NEUROLOGY | Facility: CLINIC | Age: 27
End: 2022-04-27

## 2022-04-27 VITALS
HEART RATE: 75 BPM | HEIGHT: 70 IN | SYSTOLIC BLOOD PRESSURE: 112 MMHG | WEIGHT: 132 LBS | OXYGEN SATURATION: 98 % | BODY MASS INDEX: 18.9 KG/M2 | DIASTOLIC BLOOD PRESSURE: 62 MMHG | TEMPERATURE: 96.9 F

## 2022-04-27 DIAGNOSIS — R51.9 NEW ONSET OF HEADACHES: ICD-10-CM

## 2022-04-27 DIAGNOSIS — R90.89 ABNORMAL FINDING ON MRI OF BRAIN: ICD-10-CM

## 2022-04-27 DIAGNOSIS — H51.9 ABNORMAL EYE MOVEMENTS: ICD-10-CM

## 2022-04-27 DIAGNOSIS — G40.909 SEIZURE DISORDER: Primary | ICD-10-CM

## 2022-04-27 PROCEDURE — 99214 OFFICE O/P EST MOD 30 MIN: CPT | Performed by: NURSE PRACTITIONER

## 2022-04-27 NOTE — PROGRESS NOTES
Neuro Office Visit      Encounter Date: 2022   Patient Name: Meet Santos  : 1995   MRN: [id number]   PCP: Dr Lance  Chief Complaint:    Chief Complaint   Patient presents with   • Seizures       History of Present Illness: Meet Santos is a 27 y.o. male who is here today in Neurology w his grandmother for seizure and new headaches, abnormal eye movements.    Last visit 22 w me-check labs, cont depakote ER 500mg  Valproic acid 26.0, , CBC/CMP-stable    Sz  Last sz: 2021  Medication: Depakote  1 q hs  Compliance: rarely misses a dose. Missed one today  Side effects:none  Aura: dizziness  Description: becomes rigid, no jerking, eye rolling, or tongue biting. No bladder changes, duration < 5 min, postictal  Triggers: not sleeping    Problem History  First seizure in 2021. Sitting at desk, felt dizzy, rigid and fell out of chair. No jerking. Eye rolling, no tongue biting. No bladder incontinence. Duration less than 5 minutes. Postictal. Did not go to ER    MRI: nodular lesions left post ventricle    EEG: Left temporal sharp waves    Meds: Keppra-irritability, Depakote ER 500mg    HA  Onset: few months ago  Freq: 1-2/ week. None this week  Location: left crown  Quality: Pressure   Severity: mild  Duration: few hours  Assoc: -nausea, -vomiting, +photophobia, -phonophobia, -vision changes    Abortives: IBU-effective      Abnormal eyelid movements  Chronic, left upper lid closes slowly at times. No vision changes. Recent eye exam is normal. No change in vision. No diplopia. No eye pain.    PMH: allergies.  Subjective      Past Medical History:   Past Medical History:   Diagnosis Date   • Seizures (HCC)        Past Surgical History:   Past Surgical History:   Procedure Laterality Date   • NO PAST SURGERIES         Family History:   Family History   Problem Relation Age of Onset   • No Known Problems Mother         RN   • Heart disease Father    • No Known Problems  Brother    • Asthma Maternal Grandmother    • No Known Problems Maternal Grandfather        Social History:   Social History     Socioeconomic History   • Marital status: Single   Tobacco Use   • Smoking status: Current Every Day Smoker     Years: 5.00   • Smokeless tobacco: Never Used   • Tobacco comment: vaping currently    Vaping Use   • Vaping Use: Every day   • Substances: Nicotine, Flavoring   Substance and Sexual Activity   • Alcohol use: Yes     Alcohol/week: 6.0 standard drinks     Types: 6 Shots of liquor per week   • Drug use: Yes     Types: Marijuana   • Sexual activity: Defer       Medications:     Current Outpatient Medications:   •  divalproex (Depakote ER) 500 MG 24 hr tablet, Take one tablet by mouth at bedtime for 7 days, then take 2 tabs at bedtime as directed. (Patient taking differently: 1,000 mg every night at bedtime.), Disp: 60 tablet, Rfl: 10    Allergies:   No Known Allergies    PHQ-9 Total Score:     STEADI Fall Risk Assessment has not been completed.    Objective     Physical Exam:   Physical Exam  Eyes:      Pupils: Pupils are equal, round, and reactive to light.   Neurological:      Mental Status: He is oriented to person, place, and time.      Gait: Gait is intact.   Psychiatric:         Speech: Speech normal.         Neurologic Exam     Mental Status   Oriented to person, place, and time.   Follows 3 step commands.   Attention: normal. Concentration: normal.   Speech: speech is normal   Level of consciousness: alert  Knowledge: consistent with education.   Normal comprehension.     Cranial Nerves     CN III, IV, VI   Pupils are equal, round, and reactive to light.  Right pupil: Accommodation: intact.   Left pupil: Accommodation: intact.   CN III: no CN III palsy  CN VI: no CN VI palsy  Nystagmus: none   Diplopia: none  Upgaze: normal  Downgaze: normal  Conjugate gaze: present    CN VII   Facial expression full, symmetric.     CN VIII   Hearing: intact    CN XII   CN XII normal.   At  "times left upper lid will close independently of right     Motor Exam   Muscle bulk: normal  Overall muscle tone: normal    Strength   Right biceps: 5/5  Left biceps: 5/5  Right triceps: 5/5  Left triceps: 5/5  Right interossei: 5/5  Left interossei: 5/5  Right quadriceps: 5/5  Left quadriceps: 5/5  Right anterior tibial: 5/5  Left anterior tibial: 5/5  Right posterior tibial: 5/5  Left posterior tibial: 5/5    Sensory Exam   Light touch normal.     Gait, Coordination, and Reflexes     Gait  Gait: normal    Tremor   Resting tremor: absent  Action tremor: absent       Vital Signs:   Vitals:    04/27/22 0818   BP: 112/62   Pulse: 75   Temp: 96.9 °F (36.1 °C)   SpO2: 98%   Weight: 59.9 kg (132 lb)   Height: 177.8 cm (70\")     Body mass index is 18.94 kg/m².     Results:   Imaging:   MRI Brain With & Without Contrast    Result Date: 1/2/2022  Stable appearance of nodular gray matter heterotopia likely in the left lateral ventricle posterior horn predominantly as noted on prior comparison without abnormal enhancement of these areas or elsewhere and no cystic change with interval involvement of heterogeneous signal otherwise.  DICTATED:   01/02/2022 EDITED/lfs:   01/02/2022    This report was finalized on 1/2/2022 4:31 PM by Dr. Ivan Rodriguez.         Assessment / Plan      Assessment/Plan:   Diagnoses and all orders for this visit:    1. Seizure disorder (HCC) (Primary)  Comments:  Cont Depakote  1 q hs    2. Abnormal finding on MRI of brain  Comments:  Discussed repeating MRI of brain with new onset of HA. Pt and family would like to wait until July since HA is mild and relieved by IBU    3. New onset of headaches  Comments:  Repeat MRI in July per pt wishes. May take IBU to alleviate pain. Hydrate and get plenty of sleep.    4. Abnormal eye movements  Comments:  Repeat MRI           Patient Education:   I have instructed and given the patient education on the importance of not driving and operating heavy " machinery. No solo bathing or tub baths for 3 months from onset of the most recent seizure.   Minimize stress as much as possible. I have recommended 7-8 hours of sleep each night. Abstain from alcohol intake. Educated on Antiepileptic medications with possible side effects and signs and symptoms to report if prescribed during visit. Instructed to take seizure medication daily if prescribed. Reviewed potential seizure risk factors.   I have instructed the patient to call 911 or our office if another seizure does occur. Patient verbalizes and understands.     Reviewed medications, potential side effects and signs and symptoms to report. Discussed risk versus benefits of treatment plan with patient and/or family-including medications, labs and radiology that may be ordered. Addressed questions and concerns during visit. Patient and/or family verbalized understanding and agree with plan. Instructed to call the office with any questions and report to ER with any life-threatening symptoms.     Follow Up:   July  During this visit the following were done:  Labs Reviewed []    Labs Ordered []    Radiology Reports Reviewed []    Radiology Ordered []    PCP Records Reviewed []    Referring Provider Records Reviewed []    ER Records Reviewed []    Hospital Records Reviewed []    History Obtained From Family [x]    Radiology Images Reviewed [x]    Other Reviewed [x]    Records Requested []      Yamile Chester, NELLI, APRN

## 2022-07-01 ENCOUNTER — OFFICE VISIT (OUTPATIENT)
Dept: NEUROLOGY | Facility: CLINIC | Age: 27
End: 2022-07-01

## 2022-07-01 ENCOUNTER — LAB (OUTPATIENT)
Dept: LAB | Facility: HOSPITAL | Age: 27
End: 2022-07-01

## 2022-07-01 VITALS
OXYGEN SATURATION: 98 % | HEART RATE: 63 BPM | TEMPERATURE: 96.6 F | DIASTOLIC BLOOD PRESSURE: 70 MMHG | WEIGHT: 138 LBS | HEIGHT: 70 IN | BODY MASS INDEX: 19.76 KG/M2 | SYSTOLIC BLOOD PRESSURE: 112 MMHG

## 2022-07-01 DIAGNOSIS — R51.9 NEW ONSET OF HEADACHES: ICD-10-CM

## 2022-07-01 DIAGNOSIS — H51.9 ABNORMAL EYE MOVEMENTS: ICD-10-CM

## 2022-07-01 DIAGNOSIS — G40.909 SEIZURE DISORDER: Primary | ICD-10-CM

## 2022-07-01 DIAGNOSIS — G40.909 SEIZURE DISORDER: ICD-10-CM

## 2022-07-01 DIAGNOSIS — R90.89 ABNORMAL FINDING ON MRI OF BRAIN: ICD-10-CM

## 2022-07-01 LAB
ALBUMIN SERPL-MCNC: 4.9 G/DL (ref 3.5–5.2)
ALBUMIN/GLOB SERPL: 1.9 G/DL
ALP SERPL-CCNC: 78 U/L (ref 39–117)
ALT SERPL W P-5'-P-CCNC: 10 U/L (ref 1–41)
ANION GAP SERPL CALCULATED.3IONS-SCNC: 11 MMOL/L (ref 5–15)
AST SERPL-CCNC: 16 U/L (ref 1–40)
BASOPHILS # BLD AUTO: 0.05 10*3/MM3 (ref 0–0.2)
BASOPHILS NFR BLD AUTO: 0.9 % (ref 0–1.5)
BILIRUB SERPL-MCNC: 0.7 MG/DL (ref 0–1.2)
BUN SERPL-MCNC: 19 MG/DL (ref 6–20)
BUN/CREAT SERPL: 18.3 (ref 7–25)
CALCIUM SPEC-SCNC: 10 MG/DL (ref 8.6–10.5)
CHLORIDE SERPL-SCNC: 102 MMOL/L (ref 98–107)
CO2 SERPL-SCNC: 29 MMOL/L (ref 22–29)
CREAT SERPL-MCNC: 1.04 MG/DL (ref 0.76–1.27)
DEPRECATED RDW RBC AUTO: 40.1 FL (ref 37–54)
EGFRCR SERPLBLD CKD-EPI 2021: 102.2 ML/MIN/1.73
EOSINOPHIL # BLD AUTO: 0.2 10*3/MM3 (ref 0–0.4)
EOSINOPHIL NFR BLD AUTO: 3.5 % (ref 0.3–6.2)
ERYTHROCYTE [DISTWIDTH] IN BLOOD BY AUTOMATED COUNT: 12.3 % (ref 12.3–15.4)
GLOBULIN UR ELPH-MCNC: 2.6 GM/DL
GLUCOSE SERPL-MCNC: 53 MG/DL (ref 65–99)
HCT VFR BLD AUTO: 45.1 % (ref 37.5–51)
HGB BLD-MCNC: 15.6 G/DL (ref 13–17.7)
IMM GRANULOCYTES # BLD AUTO: 0.01 10*3/MM3 (ref 0–0.05)
IMM GRANULOCYTES NFR BLD AUTO: 0.2 % (ref 0–0.5)
LYMPHOCYTES # BLD AUTO: 1.89 10*3/MM3 (ref 0.7–3.1)
LYMPHOCYTES NFR BLD AUTO: 33 % (ref 19.6–45.3)
MCH RBC QN AUTO: 31.5 PG (ref 26.6–33)
MCHC RBC AUTO-ENTMCNC: 34.6 G/DL (ref 31.5–35.7)
MCV RBC AUTO: 91.1 FL (ref 79–97)
MONOCYTES # BLD AUTO: 0.7 10*3/MM3 (ref 0.1–0.9)
MONOCYTES NFR BLD AUTO: 12.2 % (ref 5–12)
NEUTROPHILS NFR BLD AUTO: 2.87 10*3/MM3 (ref 1.7–7)
NEUTROPHILS NFR BLD AUTO: 50.2 % (ref 42.7–76)
NRBC BLD AUTO-RTO: 0 /100 WBC (ref 0–0.2)
PLATELET # BLD AUTO: 231 10*3/MM3 (ref 140–450)
PMV BLD AUTO: 10.1 FL (ref 6–12)
POTASSIUM SERPL-SCNC: 4.6 MMOL/L (ref 3.5–5.2)
PROT SERPL-MCNC: 7.5 G/DL (ref 6–8.5)
RBC # BLD AUTO: 4.95 10*6/MM3 (ref 4.14–5.8)
SODIUM SERPL-SCNC: 142 MMOL/L (ref 136–145)
VALPROATE SERPL-MCNC: 16 MCG/ML (ref 50–125)
WBC NRBC COR # BLD: 5.72 10*3/MM3 (ref 3.4–10.8)

## 2022-07-01 PROCEDURE — 99214 OFFICE O/P EST MOD 30 MIN: CPT | Performed by: NURSE PRACTITIONER

## 2022-07-01 PROCEDURE — 36415 COLL VENOUS BLD VENIPUNCTURE: CPT

## 2022-07-01 PROCEDURE — 85025 COMPLETE CBC W/AUTO DIFF WBC: CPT | Performed by: NURSE PRACTITIONER

## 2022-07-01 PROCEDURE — 80053 COMPREHEN METABOLIC PANEL: CPT | Performed by: NURSE PRACTITIONER

## 2022-07-01 PROCEDURE — 86362 MOG-IGG1 ANTB CBA EACH: CPT | Performed by: NURSE PRACTITIONER

## 2022-07-01 PROCEDURE — 86051 AQUAPORIN-4 ANTB ELISA: CPT | Performed by: NURSE PRACTITIONER

## 2022-07-01 PROCEDURE — 80164 ASSAY DIPROPYLACETIC ACD TOT: CPT | Performed by: NURSE PRACTITIONER

## 2022-07-01 RX ORDER — FLUTICASONE PROPIONATE 50 MCG
2 SPRAY, SUSPENSION (ML) NASAL DAILY
COMMUNITY
Start: 2022-05-21

## 2022-07-01 NOTE — PROGRESS NOTES
Headache New Office Visit      Encounter Date: 2022   Patient Name: Meet Santos  : 1995   MRN: [id number]   PCP: Dr Lance  Chief Complaint:    Chief Complaint   Patient presents with   • Seizures       History of Present Illness: Meet Santos is a 27 y.o. male who is here today with his grandmother for evaluation of headaches, sz, abnormal movements.    Last visit 2022 w me-cont Depakote  1 q hs, repeat MRI in July    Doing much better with sleep cycle. Requests FMLA to be off work when he feels excessively tired which is a trigger for his seizures.    Seizures  Medication:Depakote  1 q hs  Compliance: rare missed dose  Side effects:none  Last seizure:2021  Aura:dizziness  Triggers:not sleeping  Description:becomes rigid, no jerking, eye rolling, or tongue biting. No bladder changes, duration < 5 min, postictal    PH (copied from last note)  First seizure in 2021. Sitting at desk, felt dizzy, rigid and fell out of chair. No jerking. Eye rolling, no tongue biting. No bladder incontinence. Duration less than 5 minutes. Postictal. Did not go to ER     MRI: nodular lesions left post ventricle     EEG: Left temporal sharp waves     Meds: Keppra-irritability, Depakote ER 500mg       Headache Symptoms:     Improved with increased sleep quality    Days per month: 3/month  Location: left crown      Quality: Pressure        Duration: few hours  Severity: mild  Triggers:   Associated Symptoms: Photophobia and denies nausea, vomiting, phonophobia, vision changes  Aura: none      Abortives: IBU   Preventives                                                                             PH  Onset       Abnormal eyelid movements  Abnormal eyelid movements have improved.  Chronic left upper lid closes slowly at times. No vision changes. Recent eye exam is normal. Denies diplopia and eye pain.    PMH: seasonal allergies.  Subjective      Past Medical History:   Past Medical  History:   Diagnosis Date   • Seizures (HCC)        Past Surgical History:   Past Surgical History:   Procedure Laterality Date   • NO PAST SURGERIES         Family History:   Family History   Problem Relation Age of Onset   • No Known Problems Mother         RN   • Heart disease Father    • No Known Problems Brother    • Asthma Maternal Grandmother    • No Known Problems Maternal Grandfather        Social History:   Social History     Socioeconomic History   • Marital status: Single   Tobacco Use   • Smoking status: Current Every Day Smoker     Years: 5.00   • Smokeless tobacco: Never Used   • Tobacco comment: vaping currently    Vaping Use   • Vaping Use: Every day   • Substances: Nicotine, Flavoring   Substance and Sexual Activity   • Alcohol use: Yes     Alcohol/week: 6.0 standard drinks     Types: 6 Shots of liquor per week   • Drug use: Yes     Types: Marijuana   • Sexual activity: Defer       Medications:     Current Outpatient Medications:   •  divalproex (Depakote ER) 500 MG 24 hr tablet, Take one tablet by mouth at bedtime for 7 days, then take 2 tabs at bedtime as directed. (Patient taking differently: 1,000 mg every night at bedtime.), Disp: 60 tablet, Rfl: 10  •  fluticasone (FLONASE) 50 MCG/ACT nasal spray, 2 sprays Daily., Disp: , Rfl:     Allergies:   No Known Allergies    PHQ-9 Total Score:     STEADI Fall Risk Assessment has not been completed.    Objective     Physical Exam:   Physical Exam  Eyes:      Pupils: Pupils are equal, round, and reactive to light.   Neurological:      Mental Status: He is oriented to person, place, and time.      Coordination: Finger-Nose-Finger Test, Heel to Shin Test and Romberg Test normal.      Gait: Gait is intact.      Deep Tendon Reflexes:      Reflex Scores:       Tricep reflexes are 2+ on the right side and 2+ on the left side.       Bicep reflexes are 2+ on the right side and 2+ on the left side.       Brachioradialis reflexes are 2+ on the right side and 2+ on  "the left side.       Patellar reflexes are 2+ on the right side and 2+ on the left side.       Achilles reflexes are 2+ on the right side and 2+ on the left side.  Psychiatric:         Speech: Speech normal.         Neurologic Exam     Mental Status   Oriented to person, place, and time.   Follows 3 step commands.   Attention: normal. Concentration: normal.   Speech: speech is normal   Level of consciousness: alert  Knowledge: consistent with education.   Normal comprehension.     Cranial Nerves     CN III, IV, VI   Pupils are equal, round, and reactive to light.  Right pupil: Accommodation: intact.   Left pupil: Accommodation: intact.   CN III: no CN III palsy  CN VI: no CN VI palsy  Nystagmus: none   Diplopia: none  Upgaze: normal  Downgaze: normal  Conjugate gaze: present    CN VII   Facial expression full, symmetric.     CN VIII   Hearing: intact    CN XII   CN XII normal.     Motor Exam   Muscle bulk: normal  Overall muscle tone: normal    Strength   Right biceps: 5/5  Left biceps: 5/5  Right triceps: 5/5  Left triceps: 5/5  Right interossei: 5/5  Left interossei: 5/5  Right quadriceps: 5/5  Left quadriceps: 5/5  Right anterior tibial: 5/5  Left anterior tibial: 5/5  Right posterior tibial: 5/5  Left posterior tibial: 5/5    Sensory Exam   Light touch normal.     Gait, Coordination, and Reflexes     Gait  Gait: normal    Coordination   Romberg: negative  Finger to nose coordination: normal  Heel to shin coordination: normal    Tremor   Resting tremor: absent  Action tremor: absent    Reflexes   Right brachioradialis: 2+  Left brachioradialis: 2+  Right biceps: 2+  Left biceps: 2+  Right triceps: 2+  Left triceps: 2+  Right patellar: 2+  Left patellar: 2+  Right achilles: 2+  Left achilles: 2+  Right : 2+  Left : 2+       Vital Signs:   Vitals:    07/01/22 1310   BP: 112/70   Pulse: 63   Temp: 96.6 °F (35.9 °C)   SpO2: 98%   Weight: 62.6 kg (138 lb)   Height: 177.8 cm (70\")     Body mass index is 19.8 " kg/m².       Assessment / Plan      Assessment/Plan:   Diagnoses and all orders for this visit:    1. Seizure disorder (HCC) (Primary)  Comments:  Cont Depakote  1 q hs.  FMLA forms completed.  Orders:  -     MRI Brain With & Without Contrast; Future  -     Valproic Acid Level, Total; Future  -     CBC Auto Differential; Future  -     Comprehensive Metabolic Panel; Future    2. New onset of headaches  Comments:  Improved. Cont to monitor  Orders:  -     MRI Brain With & Without Contrast; Future    3. Abnormal eye movements  Comments:  Improved with increased sleep quality  Orders:  -     MRI Brain With & Without Contrast; Future  -     Anti-Myelin Oligodendrocyte Glycoprotein (MOG), Serum; Future  -     NMO IgG Autoantibodies; Future    4. Abnormal finding on MRI of brain  Comments:  Repeat MRI this Sept           Patient Education:   I have discussed with the patient today the causes and overview of headaches. We discussed the different types of headaches to include tension-type headaches, Migraine headaches and Cluster headaches. We also discussed when headaches could or would be of a more serious condition such as brain infection, inflammation or bleeding within or around the brain. When to seek immediate medical attention or call 911.     Reviewed medications, potential side effects and signs and symptoms to report. Discussed risk versus benefits of treatment plan with patient and/or family-including medications, labs and radiology that may be ordered. Addressed questions and concerns during visit. Patient and/or family verbalized understanding and agree with plan. Instructed to call the office with any questions and report to ER with any life-threatening symptoms.     Follow Up:   Return in about 6 months (around 1/1/2023) for Recheck.    During this visit the following were done:  Labs Reviewed [x]    Labs Ordered [x]    Radiology Reports Reviewed [x]    Radiology Ordered [x]    PCP Records Reviewed []     Referring Provider Records Reviewed []    ER Records Reviewed []    Hospital Records Reviewed []    History Obtained From Family [x]    Radiology Images Reviewed []    Other Reviewed []    Records Requested []      Yamile Chester, DNP, APRN

## 2022-07-05 LAB — MOG AB SER QL CBA IFA: NEGATIVE

## 2022-07-06 LAB — AQP4 H2O CHANNEL IGG SERPL IA-ACNC: <1.5 U/ML (ref 0–3)

## 2022-08-29 ENCOUNTER — TELEPHONE (OUTPATIENT)
Dept: NEUROLOGY | Facility: CLINIC | Age: 27
End: 2022-08-29

## 2022-08-29 ENCOUNTER — HOSPITAL ENCOUNTER (EMERGENCY)
Facility: HOSPITAL | Age: 27
Discharge: HOME | End: 2022-08-29
Attending: EMERGENCY MEDICINE | Admitting: EMERGENCY MEDICINE

## 2022-08-29 ENCOUNTER — APPOINTMENT (OUTPATIENT)
Dept: MRI IMAGING | Facility: HOSPITAL | Age: 27
End: 2022-08-29

## 2022-08-29 ENCOUNTER — APPOINTMENT (OUTPATIENT)
Dept: CT IMAGING | Facility: HOSPITAL | Age: 27
End: 2022-08-29

## 2022-08-29 VITALS
WEIGHT: 136 LBS | HEART RATE: 70 BPM | BODY MASS INDEX: 19.47 KG/M2 | HEIGHT: 70 IN | RESPIRATION RATE: 22 BRPM | TEMPERATURE: 97.7 F | OXYGEN SATURATION: 97 % | SYSTOLIC BLOOD PRESSURE: 111 MMHG | DIASTOLIC BLOOD PRESSURE: 64 MMHG

## 2022-08-29 DIAGNOSIS — R51.9 ACUTE NONINTRACTABLE HEADACHE, UNSPECIFIED HEADACHE TYPE: Primary | ICD-10-CM

## 2022-08-29 PROCEDURE — 99282 EMERGENCY DEPT VISIT SF MDM: CPT

## 2022-08-29 PROCEDURE — 70553 MRI BRAIN STEM W/O & W/DYE: CPT

## 2022-08-29 PROCEDURE — 0 GADOBENATE DIMEGLUMINE 529 MG/ML SOLUTION: Performed by: EMERGENCY MEDICINE

## 2022-08-29 PROCEDURE — A9577 INJ MULTIHANCE: HCPCS | Performed by: EMERGENCY MEDICINE

## 2022-08-29 RX ADMIN — GADOBENATE DIMEGLUMINE 12 ML: 529 INJECTION, SOLUTION INTRAVENOUS at 18:13

## 2022-08-29 NOTE — TELEPHONE ENCOUNTER
PATIENTS MOTHER CALLED - PATIENT WAS ADVISED TO GO TO THE ED TODAY FOR HEAD -  ARRIVED @ 12:30 - IS STILL THERE, HAS NOT BEEN SEEN.    WOULD LIKE TO KNOW IF THERE IS ANYTHING ELSE THEY CAN DO; HE IS TIRED OF WAITING.      YOU CAN REACH PATIENT ON GRANDMOTHERS LINE, [phone] SHE IS WITH HIM.    PLEASE ADVISE.    THANK YOU

## 2022-08-29 NOTE — TELEPHONE ENCOUNTER
Spoke with Meet's grandmother let her know needs to go to ED for eval. She states understanding and asked if he should go to [place] or  would they be able to do what they need in [place].  Notified probably be best to go to  in [place] and she states understanding.

## 2022-08-29 NOTE — TELEPHONE ENCOUNTER
Patient called in and both his provider and MA were in a room so took down his concerns:    Patient states yesterday got up from his desk and the back of his head feels kind of swollen on the left side. Whenever he gets pain in the back of his head lasts for a minute or 2 but past 2 days it feels swollen.   Parvizance came to bring him lunch and felt the back of it. He's touching it right now and it's slowly getting more painful.    He's at home for lunch break right now but is supposed to go back into work and is trying to decide what to do as he is worried. Notified will ask Gm as soon as she gets out of this room so he will hold off on going back into work and wait for our call.

## 2022-08-30 NOTE — TELEPHONE ENCOUNTER
Caller: TASHA HILL    Relationship: Emergency Contact; GRANDMOTHER    Best call back number: [phone]    What was the call regarding: PT'S GRANDMOTHER CALLED TO SCHEDULE F/U FROM ED VISIT YESTERDAY.    PT SCHEDULED FOR THIS Friday, 9/2/22 @ 1PM.    DOCUMENTING PER [facility].

## 2022-09-06 ENCOUNTER — APPOINTMENT (OUTPATIENT)
Dept: MRI IMAGING | Facility: HOSPITAL | Age: 27
End: 2022-09-06

## 2022-09-10 ENCOUNTER — APPOINTMENT (OUTPATIENT)
Dept: MRI IMAGING | Facility: HOSPITAL | Age: 27
End: 2022-09-10

## 2022-10-05 ENCOUNTER — TELEPHONE (OUTPATIENT)
Dept: NEUROLOGY | Facility: CLINIC | Age: 27
End: 2022-10-05

## 2022-10-05 DIAGNOSIS — R56.9 NEW ONSET SEIZURE: ICD-10-CM

## 2022-10-05 RX ORDER — DIVALPROEX SODIUM 500 MG/1
TABLET, EXTENDED RELEASE ORAL
Qty: 60 TABLET | Refills: 10 | Status: SHIPPED | OUTPATIENT
Start: 2022-10-05 | End: 2023-01-04 | Stop reason: SDUPTHER

## 2022-10-05 NOTE — TELEPHONE ENCOUNTER
Rx Refill Note  Requested Prescriptions     Pending Prescriptions Disp Refills   • divalproex (Depakote ER) 500 MG 24 hr tablet 60 tablet 10     Sig: Take one tablet by mouth at bedtime for 7 days, then take 2 tabs at bedtime as directed.      Last filled: 10/04/2021 60 with 10 refills.  Last office visit with prescribing clinician: 7/1/2022      Next office visit with prescribing clinician: 1/4/2023     Sent in 60 with 10 refills.    Tsering Ackerman MA  10/05/22, 08:24 EDT

## 2022-10-05 NOTE — TELEPHONE ENCOUNTER
----- Message from Meet Santos sent at 10/5/2022 11:19 AM EDT -----  Regarding: Refill   Depakote ER  Rx Refill Note  Requested Prescriptions      No prescriptions requested or ordered in this encounter      Last filled:  Last office visit with prescribing clinician: 7/1/2022      Next office visit with prescribing clinician: 1/4/2023     Already sent in this am.    Tsering Ackerman MA  10/05/22, 11:40 EDT

## 2023-01-04 ENCOUNTER — OFFICE VISIT (OUTPATIENT)
Dept: NEUROLOGY | Facility: CLINIC | Age: 28
End: 2023-01-04
Payer: COMMERCIAL

## 2023-01-04 ENCOUNTER — LAB (OUTPATIENT)
Dept: LAB | Facility: HOSPITAL | Age: 28
End: 2023-01-04
Payer: COMMERCIAL

## 2023-01-04 VITALS
WEIGHT: 146 LBS | HEART RATE: 82 BPM | DIASTOLIC BLOOD PRESSURE: 50 MMHG | SYSTOLIC BLOOD PRESSURE: 102 MMHG | HEIGHT: 70 IN | TEMPERATURE: 97.5 F | OXYGEN SATURATION: 98 % | BODY MASS INDEX: 20.9 KG/M2

## 2023-01-04 DIAGNOSIS — G40.909 SEIZURE DISORDER: ICD-10-CM

## 2023-01-04 DIAGNOSIS — G40.909 SEIZURE DISORDER: Primary | ICD-10-CM

## 2023-01-04 LAB
BASOPHILS # BLD AUTO: 0.04 10*3/MM3 (ref 0–0.2)
BASOPHILS NFR BLD AUTO: 0.8 % (ref 0–1.5)
DEPRECATED RDW RBC AUTO: 40.7 FL (ref 37–54)
EOSINOPHIL # BLD AUTO: 0.16 10*3/MM3 (ref 0–0.4)
EOSINOPHIL NFR BLD AUTO: 3 % (ref 0.3–6.2)
ERYTHROCYTE [DISTWIDTH] IN BLOOD BY AUTOMATED COUNT: 12.1 % (ref 12.3–15.4)
HCT VFR BLD AUTO: 45.9 % (ref 37.5–51)
HGB BLD-MCNC: 16 G/DL (ref 13–17.7)
IMM GRANULOCYTES # BLD AUTO: 0.01 10*3/MM3 (ref 0–0.05)
IMM GRANULOCYTES NFR BLD AUTO: 0.2 % (ref 0–0.5)
LYMPHOCYTES # BLD AUTO: 1.53 10*3/MM3 (ref 0.7–3.1)
LYMPHOCYTES NFR BLD AUTO: 28.9 % (ref 19.6–45.3)
MCH RBC QN AUTO: 32.2 PG (ref 26.6–33)
MCHC RBC AUTO-ENTMCNC: 34.9 G/DL (ref 31.5–35.7)
MCV RBC AUTO: 92.4 FL (ref 79–97)
MONOCYTES # BLD AUTO: 0.53 10*3/MM3 (ref 0.1–0.9)
MONOCYTES NFR BLD AUTO: 10 % (ref 5–12)
NEUTROPHILS NFR BLD AUTO: 3.02 10*3/MM3 (ref 1.7–7)
NEUTROPHILS NFR BLD AUTO: 57.1 % (ref 42.7–76)
NRBC BLD AUTO-RTO: 0 /100 WBC (ref 0–0.2)
PLATELET # BLD AUTO: 219 10*3/MM3 (ref 140–450)
PMV BLD AUTO: 10.7 FL (ref 6–12)
RBC # BLD AUTO: 4.97 10*6/MM3 (ref 4.14–5.8)
WBC NRBC COR # BLD: 5.29 10*3/MM3 (ref 3.4–10.8)

## 2023-01-04 PROCEDURE — 36415 COLL VENOUS BLD VENIPUNCTURE: CPT

## 2023-01-04 PROCEDURE — 80053 COMPREHEN METABOLIC PANEL: CPT

## 2023-01-04 PROCEDURE — 99214 OFFICE O/P EST MOD 30 MIN: CPT | Performed by: NURSE PRACTITIONER

## 2023-01-04 PROCEDURE — 85025 COMPLETE CBC W/AUTO DIFF WBC: CPT

## 2023-01-04 PROCEDURE — 80164 ASSAY DIPROPYLACETIC ACD TOT: CPT

## 2023-01-04 RX ORDER — DIVALPROEX SODIUM 500 MG/1
TABLET, EXTENDED RELEASE ORAL
Qty: 60 TABLET | Refills: 10 | Status: SHIPPED | OUTPATIENT
Start: 2023-01-04 | End: 2023-02-07

## 2023-01-04 NOTE — PROGRESS NOTES
Neuro Office Visit      Encounter Date: 2023   Patient Name: Meet Santos  : 1995   MRN: [id number]     Chief Complaint:    Chief Complaint   Patient presents with   • Seizures       History of Present Illness: Meet Santos is a 27 y.o. male who is here today in Neurology for seizures      Last visit 22 w me-cont depakote er 500, check labs, MRI brain, improve sleep quality.    Labs:cmp nml except glucose 53, cbc, NMO/MOG-nml  Valproic acid low at 16  MRI Brain With & Without Contrast (2022 18:12)-Left periventricular heterotopic gray matter as on prior exam. No new  intracranial disease is seen.    Interval History-seen in ED for HA 22    Now on day shift.  Sleeping better.        Seizures  Medication:Depakote  1 q hs  Compliance: rare missed dose  Side effects:none  Last seizure:2021  Aura:dizziness  Triggers:not sleeping  Description:becomes rigid, no jerking, eye rolling, or tongue biting. No bladder changes, duration < 5 min, postictal     PH (copied from last note)  First seizure in 2021. Sitting at desk, felt dizzy, rigid and fell out of chair. No jerking. Eye rolling, no tongue biting. No bladder incontinence. Duration less than 5 minutes. Postictal. Did not go to ER    MRI Brain With & Without Contrast (2022 18:12)-IMPRESSION:  Left periventricular heterotopic gray matter as on prior exam. No new  intracranial disease is seen.  MRI: nodular lesions left post ventricle     EEG: Left temporal sharp wavesEEG (Hospital Performed) (2021 08:30)       Meds: Keppra-irritability, Depakote ER 500mg       Headaches  No further severe headaches. Symptoms relieved with IBU.  Days per month: 1-2/month  Location: left crown      Quality: Pressure        Duration: few hours  Severity: mild  Triggers:   Associated Symptoms: Photophobia and denies nausea, vomiting, phonophobia, vision changes  Aura: none        Abortives: IBU, sleep    Preventives                                                                             PH  Onset 2022    Abnormal Eyelid movements  Abnormal eyelid movements have improved.  Chronic left upper lid closes slowly at times. No vision changes. Recent eye exam is normal. Denies diplopia and eye pain.     PMH: seasonal allergies.  Subjective      Past Medical History:   Past Medical History:   Diagnosis Date   • Seizures (HCC)        Past Surgical History:   Past Surgical History:   Procedure Laterality Date   • NO PAST SURGERIES         Family History:   Family History   Problem Relation Age of Onset   • No Known Problems Mother         RN   • Heart disease Father    • No Known Problems Brother    • Asthma Maternal Grandmother    • No Known Problems Maternal Grandfather        Social History:   Social History     Socioeconomic History   • Marital status: Single   Tobacco Use   • Smoking status: Every Day     Years: 5.00     Types: Cigarettes   • Smokeless tobacco: Never   • Tobacco comments:     vaping currently    Vaping Use   • Vaping Use: Every day   • Substances: Nicotine, Flavoring   Substance and Sexual Activity   • Alcohol use: Yes     Alcohol/week: 6.0 standard drinks     Types: 6 Shots of liquor per week   • Drug use: Yes     Types: Marijuana   • Sexual activity: Defer       Medications:     Current Outpatient Medications:   •  divalproex (Depakote ER) 500 MG 24 hr tablet, Take 1 tablet by mouth at bedtime for 7 days, THEN take 2 tablets at bedtime as directed., Disp: 60 tablet, Rfl: 10  •  fluticasone (FLONASE) 50 MCG/ACT nasal spray, 2 sprays Daily., Disp: , Rfl:     Allergies:   No Known Allergies    PHQ-9 Total Score:     STEADI Fall Risk Assessment has not been completed.    Objective     Physical Exam:   Physical Exam  Eyes:      Pupils: Pupils are equal, round, and reactive to light.   Neurological:      Mental Status: He is oriented to person, place, and time.      Coordination: Finger-Nose-Finger  Test, Heel to [place] Test and Romberg Test normal.      Gait: Gait is intact.      Deep Tendon Reflexes:      Reflex Scores:       Tricep reflexes are 2+ on the right side and 2+ on the left side.       Bicep reflexes are 2+ on the right side and 2+ on the left side.       Brachioradialis reflexes are 2+ on the right side and 2+ on the left side.       Patellar reflexes are 2+ on the right side and 2+ on the left side.       Achilles reflexes are 2+ on the right side and 2+ on the left side.  Psychiatric:         Speech: Speech normal.         Neurologic Exam     Mental Status   Oriented to person, place, and time.   Follows 3 step commands.   Attention: normal. Concentration: normal.   Speech: speech is normal   Level of consciousness: alert  Knowledge: consistent with education.   Normal comprehension.     Cranial Nerves     CN III, IV, VI   Pupils are equal, round, and reactive to light.  Right pupil: Accommodation: intact.   Left pupil: Accommodation: intact.   CN III: no CN III palsy  CN VI: no CN VI palsy  Nystagmus: none   Diplopia: none  Upgaze: normal  Downgaze: normal  Conjugate gaze: present    CN VII   Facial expression full, symmetric.     CN VIII   Hearing: intact    CN XII   CN XII normal.     Motor Exam   Muscle bulk: normal  Overall muscle tone: normal    Strength   Right biceps: 5/5  Left biceps: 5/5  Right triceps: 5/5  Left triceps: 5/5  Right interossei: 5/5  Left interossei: 5/5  Right quadriceps: 5/5  Left quadriceps: 5/5  Right anterior tibial: 5/5  Left anterior tibial: 5/5  Right posterior tibial: 5/5  Left posterior tibial: 5/5    Sensory Exam   Right arm light touch: normal  Left arm light touch: decreased from elbow  Right leg light touch: normal  Left leg light touch: normal    Gait, Coordination, and Reflexes     Gait  Gait: normal    Coordination   Romberg: negative  Finger to nose coordination: normal  Heel to shin coordination: normal    Tremor   Resting tremor: absent  Action tremor:  absent    Reflexes   Right brachioradialis: 2+  Left brachioradialis: 2+  Right biceps: 2+  Left biceps: 2+  Right triceps: 2+  Left triceps: 2+  Right patellar: 2+  Left patellar: 2+  Right achilles: 2+  Left achilles: 2+  Right : 2+  Left : 2+       Vital Signs:   Vitals:    01/04/23 1118   BP: 102/50   Pulse: 82   Temp: 97.5 °F (36.4 °C)   SpO2: 98%   Weight: 66.2 kg (146 lb)   Height: 177.8 cm (70\")     Body mass index is 20.95 kg/m².       Assessment / Plan      Assessment/Plan:   Diagnoses and all orders for this visit:    1. Seizure disorder (HCC) (Primary)  -     divalproex (Depakote ER) 500 MG 24 hr tablet; Take 1 tablet by mouth at bedtime for 7 days, THEN take 2 tablets at bedtime as directed.  Dispense: 60 tablet; Refill: 10  -     CBC Auto Differential; Future  -     Comprehensive Metabolic Panel; Future  -     Valproic Acid Level, Total; Future         Patient Education:   I have instructed and given the patient education on the importance of not driving and operating heavy machinery. No solo bathing or tub baths for 3 months from onset of the most recent seizure.   Minimize stress as much as possible. I have recommended 7-8 hours of sleep each night. Abstain from alcohol intake. Educated on Antiepileptic medications with possible side effects and signs and symptoms to report if prescribed during visit. Instructed to take seizure medication daily if prescribed. Reviewed potential seizure risk factors.   I have instructed the patient to call 911 or our office if another seizure does occur. Patient verbalizes and understands.       Reviewed medications, potential side effects and signs and symptoms to report. Discussed risk versus benefits of treatment plan with patient and/or family-including medications, labs and radiology that may be ordered. Addressed questions and concerns during visit. Patient and/or family verbalized understanding and agree with plan. Instructed to call the office with any  questions and report to ER with any life-threatening symptoms.     Follow Up:   Return in about 6 months (around 7/4/2023).    During this visit the following were done:  Labs Reviewed [x]    Labs Ordered []    Radiology Reports Reviewed []    Radiology Ordered []    PCP Records Reviewed []    Referring Provider Records Reviewed []    ER Records Reviewed []    Hospital Records Reviewed []    History Obtained From Family [x]    Radiology Images Reviewed []    Other Reviewed [x]    Records Requested []      Yamile Chester, NELLI, APRN

## 2023-01-05 ENCOUNTER — TELEPHONE (OUTPATIENT)
Dept: NEUROLOGY | Facility: CLINIC | Age: 28
End: 2023-01-05
Payer: COMMERCIAL

## 2023-01-05 LAB
ALBUMIN SERPL-MCNC: 5.2 G/DL (ref 3.5–5.2)
ALBUMIN/GLOB SERPL: 2.2 G/DL
ALP SERPL-CCNC: 76 U/L (ref 39–117)
ALT SERPL W P-5'-P-CCNC: 18 U/L (ref 1–41)
ANION GAP SERPL CALCULATED.3IONS-SCNC: 12.5 MMOL/L (ref 5–15)
AST SERPL-CCNC: 14 U/L (ref 1–40)
BILIRUB SERPL-MCNC: 0.4 MG/DL (ref 0–1.2)
BUN SERPL-MCNC: 20 MG/DL (ref 6–20)
BUN/CREAT SERPL: 25.6 (ref 7–25)
CALCIUM SPEC-SCNC: 10 MG/DL (ref 8.6–10.5)
CHLORIDE SERPL-SCNC: 105 MMOL/L (ref 98–107)
CO2 SERPL-SCNC: 30.5 MMOL/L (ref 22–29)
CREAT SERPL-MCNC: 0.78 MG/DL (ref 0.76–1.27)
EGFRCR SERPLBLD CKD-EPI 2021: 126.9 ML/MIN/1.73
GLOBULIN UR ELPH-MCNC: 2.4 GM/DL
GLUCOSE SERPL-MCNC: 72 MG/DL (ref 65–99)
POTASSIUM SERPL-SCNC: 4.8 MMOL/L (ref 3.5–5.2)
PROT SERPL-MCNC: 7.6 G/DL (ref 6–8.5)
SODIUM SERPL-SCNC: 148 MMOL/L (ref 136–145)
VALPROATE SERPL-MCNC: 40 MCG/ML (ref 50–125)

## 2023-01-05 NOTE — TELEPHONE ENCOUNTER
Called patient and spoke with Pao and gave results.  She was understanding and appreciative.    ----- Message from Yamile Chester DNP, HALLIE sent at 1/5/2023  8:54 AM EST -----  Please notify pt his labs are stable. No change in medications at this time.    
show

## 2024-01-05 ENCOUNTER — LAB (OUTPATIENT)
Dept: LAB | Facility: HOSPITAL | Age: 29
End: 2024-01-05
Payer: COMMERCIAL

## 2024-01-05 ENCOUNTER — OFFICE VISIT (OUTPATIENT)
Dept: NEUROLOGY | Facility: CLINIC | Age: 29
End: 2024-01-05
Payer: COMMERCIAL

## 2024-01-05 VITALS
OXYGEN SATURATION: 99 % | SYSTOLIC BLOOD PRESSURE: 116 MMHG | HEART RATE: 66 BPM | DIASTOLIC BLOOD PRESSURE: 68 MMHG | HEIGHT: 70 IN | BODY MASS INDEX: 19.87 KG/M2 | WEIGHT: 138.8 LBS

## 2024-01-05 DIAGNOSIS — G40.909 SEIZURE DISORDER: ICD-10-CM

## 2024-01-05 DIAGNOSIS — G40.909 SEIZURE DISORDER: Primary | ICD-10-CM

## 2024-01-05 LAB
BASOPHILS # BLD AUTO: 0.08 10*3/MM3 (ref 0–0.2)
BASOPHILS NFR BLD AUTO: 1.2 % (ref 0–1.5)
DEPRECATED RDW RBC AUTO: 40.4 FL (ref 37–54)
EOSINOPHIL # BLD AUTO: 0.17 10*3/MM3 (ref 0–0.4)
EOSINOPHIL NFR BLD AUTO: 2.6 % (ref 0.3–6.2)
ERYTHROCYTE [DISTWIDTH] IN BLOOD BY AUTOMATED COUNT: 12.3 % (ref 12.3–15.4)
HCT VFR BLD AUTO: 45.5 % (ref 37.5–51)
HGB BLD-MCNC: 15.9 G/DL (ref 13–17.7)
IMM GRANULOCYTES # BLD AUTO: 0.02 10*3/MM3 (ref 0–0.05)
IMM GRANULOCYTES NFR BLD AUTO: 0.3 % (ref 0–0.5)
LYMPHOCYTES # BLD AUTO: 2.95 10*3/MM3 (ref 0.7–3.1)
LYMPHOCYTES NFR BLD AUTO: 44.8 % (ref 19.6–45.3)
MCH RBC QN AUTO: 32 PG (ref 26.6–33)
MCHC RBC AUTO-ENTMCNC: 34.9 G/DL (ref 31.5–35.7)
MCV RBC AUTO: 91.5 FL (ref 79–97)
MONOCYTES # BLD AUTO: 0.67 10*3/MM3 (ref 0.1–0.9)
MONOCYTES NFR BLD AUTO: 10.2 % (ref 5–12)
NEUTROPHILS NFR BLD AUTO: 2.7 10*3/MM3 (ref 1.7–7)
NEUTROPHILS NFR BLD AUTO: 40.9 % (ref 42.7–76)
NRBC BLD AUTO-RTO: 0 /100 WBC (ref 0–0.2)
PLATELET # BLD AUTO: 228 10*3/MM3 (ref 140–450)
PMV BLD AUTO: 10.3 FL (ref 6–12)
RBC # BLD AUTO: 4.97 10*6/MM3 (ref 4.14–5.8)
VALPROATE SERPL-MCNC: 54 MCG/ML (ref 50–125)
WBC NRBC COR # BLD AUTO: 6.59 10*3/MM3 (ref 3.4–10.8)

## 2024-01-05 PROCEDURE — 36415 COLL VENOUS BLD VENIPUNCTURE: CPT

## 2024-01-05 PROCEDURE — 85025 COMPLETE CBC W/AUTO DIFF WBC: CPT

## 2024-01-05 PROCEDURE — 99214 OFFICE O/P EST MOD 30 MIN: CPT | Performed by: NURSE PRACTITIONER

## 2024-01-05 PROCEDURE — 80164 ASSAY DIPROPYLACETIC ACD TOT: CPT

## 2024-01-05 RX ORDER — DIVALPROEX SODIUM 250 MG/1
250 TABLET, EXTENDED RELEASE ORAL DAILY
Qty: 90 TABLET | Refills: 3 | Status: SHIPPED | OUTPATIENT
Start: 2024-01-05 | End: 2024-01-05 | Stop reason: DRUGHIGH

## 2024-01-05 RX ORDER — DIVALPROEX SODIUM 500 MG/1
TABLET, EXTENDED RELEASE ORAL
Qty: 90 TABLET | Refills: 5 | Status: SHIPPED | OUTPATIENT
Start: 2024-01-05

## 2024-01-05 NOTE — LETTER
2024     Giuliana Lance MD  [address]  [address]  [facility] [address]    Patient: Meet Santos   YOB: 1995   Date of Visit: 2024     Dear Giuliana Lance MD:       Thank you for referring Meet Santos to me for evaluation. Below are the relevant portions of my assessment and plan of care.    If you have questions, please do not hesitate to call me. I look forward to following Meet along with you.         Sincerely,        Yamile Chester DNP, APRN        CC: No Recipients    Yamile Chester DNP, APRN  [phone]  Signed     Neuro Office Visit      Encounter Date: 2024   Patient Name: Meet Santos  : 1995   MRN: [id number]   PCP: Dr Lance  Chief Complaint:    Chief Complaint   Patient presents with   • Seizures       History of Present Illness: Meet Santos is a 28 y.o. male who is here today in Neurology for  seizures, headaches, abnormal eye movements.      Last visit 2023 w me-cont Depakote . Recommend smoking cessation.    Seizures  His fiance witnessed one seizure in his sleep with jerking fall . No foaming or loss of bladder or bowel. He does not think he missed any doses. No illness at that time. No etoh. Feels depakote blunts his personality but no true side effects.  He is currently on nightshift.    Medication:Depakote  2 q hs  Compliance: rare missed dose  Side effects:none  Last seizure:Oct 2023  Aura:dizziness  Triggers:not sleeping  Description:becomes rigid, no jerking, eye rolling, or tongue biting. No bladder changes, duration < 5 min, postictal     PH (copied from last note)  First seizure in 2021. Sitting at desk, felt dizzy, rigid and fell out of chair. No jerking. Eye rolling, no tongue biting. No bladder incontinence. Duration less than 5 minutes. Postictal. Did not go to ER     MRI Brain With & Without Contrast (2022 18:12)-IMPRESSION:  Left  periventricular heterotopic gray matter as on prior exam. No new  intracranial disease is seen.  MRI: nodular lesions left post ventricle     EEG: Left temporal sharp wavesEEG (Hospital Performed) (09/08/2021 08:30)        Meds: Keppra-irritability, Depakote ER 1000mg        Headaches  No further severe headaches. Symptoms relieved with IBU.  Days per month: 1-2/month  Location: left crown      Quality: Pressure        Duration: few hours  Severity: mild  Triggers:   Associated Symptoms: Photophobia and denies nausea, vomiting, phonophobia, vision changes  Aura: none        Abortives: IBU, sleep   Preventives: Depakote                                                                            PH  Onset 2022     Abnormal Eyelid movements  Abnormal eyelid movements have resolved.  Chronic left upper lid closes slowly at times. No vision changes. Recent eye exam is normal. Denies diplopia and eye pain.     PMH: seasonal allergies.  Subjective      Past Medical History:   Past Medical History:   Diagnosis Date   • Seizures        Past Surgical History:   Past Surgical History:   Procedure Laterality Date   • NO PAST SURGERIES         Family History:   Family History   Problem Relation Age of Onset   • No Known Problems Mother         RN   • Heart disease Father    • No Known Problems Brother    • Asthma Maternal Grandmother    • No Known Problems Maternal Grandfather        Social History:   Social History     Socioeconomic History   • Marital status: Single   Tobacco Use   • Smoking status: Every Day     Packs/day: 1.00     Years: 5.00     Additional pack years: 0.00     Total pack years: 5.00     Types: Cigarettes     Passive exposure: Never   • Smokeless tobacco: Never   • Tobacco comments:     vaping currently    Vaping Use   • Vaping Use: Every day   • Substances: Nicotine, Flavoring   • Devices: Disposable   Substance and Sexual Activity   • Alcohol use: Not Currently   • Drug use: Yes     Types: Marijuana   •  "Sexual activity: Defer       Medications:     Current Outpatient Medications:   •  divalproex (Depakote ER) 500 MG 24 hr tablet, Take 3 tablets, Disp: 90 tablet, Rfl: 5  •  fluticasone (FLONASE) 50 MCG/ACT nasal spray, 2 sprays into the nostril(s) as directed by provider As Needed., Disp: , Rfl:     Allergies:   No Known Allergies    PHQ-9 Total Score:     STEADI Fall Risk Assessment has not been completed.    Objective     Physical Exam:   Physical Exam    Neurologic Exam     Vital Signs:   Vitals:    01/05/24 0820   BP: 116/68   Pulse: 66   SpO2: 99%   Weight: 63 kg (138 lb 12.8 oz)   Height: 177.8 cm (70\")     Body mass index is 19.92 kg/m².     Results:   Imaging:   No Images in the past 120 days found..     Labs:    No results found for: \"CMP\", \"PROTEIN\", \"ANTIMOGAB\", \"[id number]\", \"JCVRESULT\", \"QUANTTBGOLD\", \"CBCDIF\", \"IGGALBSER\"     Assessment / Plan      Assessment/Plan:   Diagnoses and all orders for this visit:    1. Seizure disorder (Primary)  Comments:  Increase dose of Depakote  to 3 tabs q hs. Check labs.  Orders:  -     Valproic Acid Level, Total; Future  -     Discontinue: divalproex (Depakote ER) 250 MG 24 hr tablet; Take 1 tablet by mouth Daily. Take with depakote er 500 to total 750mg q hs  Dispense: 90 tablet; Refill: 3  -     divalproex (Depakote ER) 500 MG 24 hr tablet; Take 3 tablets  Dispense: 90 tablet; Refill: 5  -     CBC Auto Differential; Future         Patient Education:     Reviewed medications, potential side effects and signs and symptoms to report. Discussed risk versus benefits of treatment plan with patient and/or family-including medications, labs and radiology that may be ordered. Addressed questions and concerns during visit. Patient and/or family verbalized understanding and agree with plan. Instructed to call the office with any questions and report to ER with any life-threatening symptoms.     Follow Up:   Return in about 6 months (around 7/5/2024) for " Recheck.    During this visit the following were done:  Labs Reviewed []    Labs Ordered []    Radiology Reports Reviewed []    Radiology Ordered []    PCP Records Reviewed []    Referring Provider Records Reviewed []    ER Records Reviewed []    Hospital Records Reviewed []    History Obtained From Family []    Radiology Images Reviewed []    Other Reviewed [x]    Records Requested []      Yamile Chester, DNP, APRN

## 2024-01-05 NOTE — PROGRESS NOTES
Neuro Office Visit      Encounter Date: 2024   Patient Name: Meet Santos  : 1995   MRN: [id number]   PCP: Dr Lance  Chief Complaint:    Chief Complaint   Patient presents with    Seizures       History of Present Illness: Meet Santos is a 28 y.o. male who is here today in Neurology for  seizures, headaches, abnormal eye movements.      Last visit 2023 w me-cont Depakote . Recommend smoking cessation.    Seizures  His fiance witnessed one seizure in his sleep with jerking fall . No foaming or loss of bladder or bowel. He does not think he missed any doses. No illness at that time. No etoh. Feels depakote blunts his personality but no true side effects.  He is currently on nightshift.    Medication:Depakote  2 q hs  Compliance: rare missed dose  Side effects:none  Last seizure:Oct 2023  Aura:dizziness  Triggers:not sleeping  Description:becomes rigid, no jerking, eye rolling, or tongue biting. No bladder changes, duration < 5 min, postictal     PH (copied from last note)  First seizure in 2021. Sitting at desk, felt dizzy, rigid and fell out of chair. No jerking. Eye rolling, no tongue biting. No bladder incontinence. Duration less than 5 minutes. Postictal. Did not go to ER     MRI Brain With & Without Contrast (2022 18:12)-IMPRESSION:  Left periventricular heterotopic gray matter as on prior exam. No new  intracranial disease is seen.  MRI: nodular lesions left post ventricle     EEG: Left temporal sharp wavesEEG (Hospital Performed) (2021 08:30)        Meds: Keppra-irritability, Depakote ER 1000mg        Headaches  No further severe headaches. Symptoms relieved with IBU.  Days per month: 1-2/month  Location: left crown      Quality: Pressure        Duration: few hours  Severity: mild  Triggers:   Associated Symptoms: Photophobia and denies nausea, vomiting, phonophobia, vision changes  Aura: none        Abortives: IBU, sleep  "  Preventives: Depakote                                                                            PH  Onset 2022     Abnormal Eyelid movements  Abnormal eyelid movements have resolved.  Chronic left upper lid closes slowly at times. No vision changes. Recent eye exam is normal. Denies diplopia and eye pain.     PMH: seasonal allergies.  Subjective      Past Medical History:   Past Medical History:   Diagnosis Date    Seizures        Past Surgical History:   Past Surgical History:   Procedure Laterality Date    NO PAST SURGERIES         Family History:   Family History   Problem Relation Age of Onset    No Known Problems Mother         RN    Heart disease Father     No Known Problems Brother     Asthma Maternal Grandmother     No Known Problems Maternal Grandfather        Social History:   Social History     Socioeconomic History    Marital status: Single   Tobacco Use    Smoking status: Every Day     Packs/day: 1.00     Years: 5.00     Additional pack years: 0.00     Total pack years: 5.00     Types: Cigarettes     Passive exposure: Never    Smokeless tobacco: Never    Tobacco comments:     vaping currently    Vaping Use    Vaping Use: Every day    Substances: Nicotine, Flavoring    Devices: Disposable   Substance and Sexual Activity    Alcohol use: Not Currently    Drug use: Yes     Types: Marijuana    Sexual activity: Defer       Medications:     Current Outpatient Medications:     divalproex (Depakote ER) 500 MG 24 hr tablet, Take 3 tablets, Disp: 90 tablet, Rfl: 5    fluticasone (FLONASE) 50 MCG/ACT nasal spray, 2 sprays into the nostril(s) as directed by provider As Needed., Disp: , Rfl:     Allergies:   No Known Allergies    PHQ-9 Total Score:     STEADI Fall Risk Assessment has not been completed.    Objective     Physical Exam:   Physical Exam    Neurologic Exam     Vital Signs:   Vitals:    01/05/24 0820   BP: 116/68   Pulse: 66   SpO2: 99%   Weight: 63 kg (138 lb 12.8 oz)   Height: 177.8 cm (70\")     Body " "mass index is 19.92 kg/m².     Results:   Imaging:   No Images in the past 120 days found..     Labs:    No results found for: \"CMP\", \"PROTEIN\", \"ANTIMOGAB\", \"AXWRMV1FDOC\", \"JCVRESULT\", \"QUANTTBGOLD\", \"CBCDIF\", \"IGGALBSER\"     Assessment / Plan      Assessment/Plan:   Diagnoses and all orders for this visit:    1. Seizure disorder (Primary)  Comments:  Increase dose of Depakote  to 3 tabs q hs. Check labs.  Orders:  -     Valproic Acid Level, Total; Future  -     Discontinue: divalproex (Depakote ER) 250 MG 24 hr tablet; Take 1 tablet by mouth Daily. Take with depakote er 500 to total 750mg q hs  Dispense: 90 tablet; Refill: 3  -     divalproex (Depakote ER) 500 MG 24 hr tablet; Take 3 tablets  Dispense: 90 tablet; Refill: 5  -     CBC Auto Differential; Future         Patient Education:     Reviewed medications, potential side effects and signs and symptoms to report. Discussed risk versus benefits of treatment plan with patient and/or family-including medications, labs and radiology that may be ordered. Addressed questions and concerns during visit. Patient and/or family verbalized understanding and agree with plan. Instructed to call the office with any questions and report to ER with any life-threatening symptoms.     Follow Up:   Return in about 6 months (around 7/5/2024) for Recheck.    During this visit the following were done:  Labs Reviewed []    Labs Ordered []    Radiology Reports Reviewed []    Radiology Ordered []    PCP Records Reviewed []    Referring Provider Records Reviewed []    ER Records Reviewed []    Hospital Records Reviewed []    History Obtained From Family []    Radiology Images Reviewed []    Other Reviewed [x]    Records Requested []      Yamile Chester, DNP, APRN  "

## 2024-01-08 ENCOUNTER — TELEPHONE (OUTPATIENT)
Dept: NEUROLOGY | Facility: CLINIC | Age: 29
End: 2024-01-08
Payer: COMMERCIAL

## 2024-01-08 NOTE — TELEPHONE ENCOUNTER
Called patient and spoke with grandparent Pao and gave results.  She stated she would relay the message.    ----- Message from Yamile Chester DNP, HALLIE sent at 1/8/2024  1:12 PM EST -----  Please notify pt labs are stable.

## 2024-05-24 ENCOUNTER — OFFICE VISIT (OUTPATIENT)
Dept: NEUROLOGY | Facility: CLINIC | Age: 29
End: 2024-05-24
Payer: COMMERCIAL

## 2024-05-24 VITALS
OXYGEN SATURATION: 98 % | HEIGHT: 70 IN | WEIGHT: 131 LBS | DIASTOLIC BLOOD PRESSURE: 68 MMHG | BODY MASS INDEX: 18.75 KG/M2 | SYSTOLIC BLOOD PRESSURE: 120 MMHG | HEART RATE: 76 BPM

## 2024-05-24 DIAGNOSIS — G40.909 SEIZURE DISORDER: Primary | ICD-10-CM

## 2024-05-24 DIAGNOSIS — S09.90XD TRAUMATIC INJURY OF HEAD, SUBSEQUENT ENCOUNTER: ICD-10-CM

## 2024-05-24 RX ORDER — ONDANSETRON 8 MG/1
8 TABLET, ORALLY DISINTEGRATING ORAL EVERY 8 HOURS PRN
COMMUNITY
Start: 2024-05-16 | End: 2024-05-24

## 2024-05-24 RX ORDER — LACOSAMIDE 100 MG/1
100 TABLET ORAL EVERY 12 HOURS SCHEDULED
Qty: 60 TABLET | Refills: 5 | Status: SHIPPED | OUTPATIENT
Start: 2024-05-24

## 2024-05-24 NOTE — LETTER
May 24, 2024     Giuliana Lance MD  [address]  [address]  [facility] [address]    Patient: Meet Santos   YOB: 1995   Date of Visit: 2024     Dear Giuliana Lance MD:       Thank you for referring Meet Santos to me for evaluation. Below are the relevant portions of my assessment and plan of care.    If you have questions, please do not hesitate to call me. I look forward to following Meet along with you.         Sincerely,        Yamile Chester DNP, APRN        CC: No Recipients    Yamile Chester DNP, APRN  [phone]  Signed     Neuro Office Visit      Encounter Date: 2024   Patient Name: Meet Santos  : 1995   MRN: [id number]   PCP: Dr Lance  Chief Complaint:    Chief Complaint   Patient presents with   • Seizures       History of Present Illness: Meet Santos is a 29 y.o. male who is here today in Neurology for  seizures.      Last visit 2024 w me-Inc depakote ER to 500mg 3 tabs q hs, check labs.    CBC-stable  Valproic acid 54      Seizures  Pt presents with 2 seizures since last visit.  24. Sz in parking lot at work. 3 minutes of being rigid with eye rolling and foaming. Sustained a head injury requiring staples. He had GI virus prior to episode. Mother explains he was not taking full dose of depakote at that time. CTH-neg in ER  Had a second seizure 24. Pt became rigid with eye rolling, foaming with 3-4 minute duration. No injury  Pt also had a spontaneous pneumothorax on left not related to seizures. CT is now out.    He denies any vision changes, headaches, numbness or tingling. He is more anxious.  [employer] forms completed for work.         Medication:Depakote  2 q hs  Compliance: rare missed dose  Side effects:none  Last seizure:May 18, 2024  Aura:dizziness  Triggers:not sleeping  Description:becomes rigid, no jerking, eye rolling, or tongue biting. No bladder changes, duration < 5  min, postictal     PH (copied from last note)  First seizure in Feb 2021. Sitting at desk, felt dizzy, rigid and fell out of chair. No jerking. Eye rolling, no tongue biting. No bladder incontinence. Duration less than 5 minutes. Postictal. Did not go to ER  FAll 2023 had sz witnessed by daniela. No missed meds       MRI Brain With & Without Contrast (08/29/2022 18:12)-IMPRESSION:  Left periventricular heterotopic gray matter as on prior exam. No new  intracranial disease is seen.  MRI: nodular lesions left post ventricle     EEG: Left temporal sharp wavesEEG (Hospital Performed) (09/08/2021 08:30)        Meds: Keppra-irritability, Depakote ER 1500mg        Headaches  No further severe headaches. Symptoms relieved with IBU.  Days per month: 1-2/month  Location: left crown      Quality: Pressure        Duration: few hours  Severity: mild  Triggers:   Associated Symptoms: Photophobia and denies nausea, vomiting, phonophobia, vision changes  Aura: none        Abortives: IBU, sleep   Preventives: Depakote                                                                            PH  Onset 2022     Abnormal Eyelid movements  Abnormal eyelid movements have resolved.  Chronic left upper lid closes slowly at times. No vision changes. Recent eye exam is normal. Denies diplopia and eye pain.     PMH: seasonal allergies.Seizure disorder  Subjective      Past Medical History:   Past Medical History:   Diagnosis Date   • Seizures        Past Surgical History:   Past Surgical History:   Procedure Laterality Date   • NO PAST SURGERIES         Family History:   Family History   Problem Relation Age of Onset   • No Known Problems Mother         RN   • Heart disease Father    • No Known Problems Brother    • Asthma Maternal Grandmother    • No Known Problems Maternal Grandfather        Social History:   Social History     Socioeconomic History   • Marital status: Single   Tobacco Use   • Smoking status: Every Day     Current  packs/day: 1.00     Average packs/day: 1 pack/day for 5.0 years (5.0 ttl pk-yrs)     Types: Cigarettes     Passive exposure: Never   • Smokeless tobacco: Never   • Tobacco comments:     vaping currently    Vaping Use   • Vaping status: Every Day   • Substances: Nicotine, Flavoring   • Devices: Disposable   Substance and Sexual Activity   • Alcohol use: Not Currently   • Drug use: Yes     Types: Marijuana   • Sexual activity: Defer       Medications:     Current Outpatient Medications:   •  divalproex (Depakote ER) 500 MG 24 hr tablet, Take 3 tablets, Disp: 90 tablet, Rfl: 5  •  fluticasone (FLONASE) 50 MCG/ACT nasal spray, 2 sprays into the nostril(s) as directed by provider As Needed., Disp: , Rfl:   •  lacosamide (VIMPAT) 100 MG tablet tablet, Take 1 tablet by mouth Every 12 (Twelve) Hours., Disp: 60 tablet, Rfl: 5    Allergies:   No Known Allergies    PHQ-9 Total Score:     STEADI Fall Risk Assessment has not been completed.    Objective     Physical Exam:   Physical Exam  Neurological:      Mental Status: He is oriented to person, place, and time.      Coordination: Heel to Shin Test normal.      Gait: Gait is intact.   Psychiatric:         Speech: Speech normal.         Neurologic Exam     Mental Status   Oriented to person, place, and time.   Follows 3 step commands.   Attention: normal. Concentration: normal.   Speech: speech is normal   Level of consciousness: alert  Knowledge: consistent with education.   Normal comprehension.     Cranial Nerves     CN III, IV, VI   CN VI: no CN VI palsy  Upgaze: normal  Downgaze: normal  Conjugate gaze: present    CN VII   Facial expression full, symmetric.     CN VIII   Hearing: intact  Head wound is well healed     Motor Exam   Muscle bulk: normal  Overall muscle tone: normal    Strength   Right biceps: 5/5  Left biceps: 5/5  Right triceps: 5/5  Left triceps: 5/5  Right interossei: 5/5  Left interossei: 5/5  Right quadriceps: 5/5  Left quadriceps: 5/5  Right anterior  "tibial: 5/5  Left anterior tibial: 5/5  Right posterior tibial: 5/5  Left posterior tibial: 5/5    Sensory Exam   Light touch normal.     Gait, Coordination, and Reflexes     Gait  Gait: normal    Coordination   Heel to shin coordination: normal    Tremor   Resting tremor: absent  Action tremor: absent       Vital Signs:   Vitals:    05/24/24 1249   BP: 120/68   Pulse: 76   SpO2: 98%   Weight: 59.4 kg (131 lb)   Height: 177.8 cm (70\")     Body mass index is 18.8 kg/m².         Assessment / Plan      Assessment/Plan:   Diagnoses and all orders for this visit:    1. Seizure disorder (Primary)  Comments:  no driving for 90 days. Add vimpat to depakote  Orders:  -     MRI Brain With & Without Contrast; Future  -     lacosamide (VIMPAT) 100 MG tablet tablet; Take 1 tablet by mouth Every 12 (Twelve) Hours.  Dispense: 60 tablet; Refill: 5    2. Traumatic injury of head, subsequent encounter  -     MRI Brain With & Without Contrast; Future       Pt to send copy of labs and CTH obtained in ED.      Patient Education:   I have instructed and given the patient education on the importance of not driving and operating heavy machinery. No solo bathing or tub baths for 3 months from onset of the most recent seizure.   Minimize stress as much as possible. I have recommended 7-8 hours of sleep each night. Abstain from alcohol intake. Educated on Antiepileptic medications with possible side effects and signs and symptoms to report if prescribed during visit. Instructed to take seizure medication daily if prescribed. Reviewed potential seizure risk factors.   I have instructed the patient to call 911 or our office if another seizure does occur. Patient verbalizes and understands.         Reviewed medications, potential side effects and signs and symptoms to report. Discussed risk versus benefits of treatment plan with patient and/or family-including medications, labs and radiology that may be ordered. Addressed questions and concerns " during visit. Patient and/or family verbalized understanding and agree with plan. Instructed to call the office with any questions and report to ER with any life-threatening symptoms.     Follow Up:   Return in about 3 months (around 8/24/2024) for Recheck.    During this visit the following were done:  Labs Reviewed []    Labs Ordered []    Radiology Reports Reviewed []    Radiology Ordered []    PCP Records Reviewed []    Referring Provider Records Reviewed []    ER Records Reviewed []    Hospital Records Reviewed []    History Obtained From Family [x]    Radiology Images Reviewed []    Other Reviewed [x]    Records Requested []      Yamile Chester, DNP, APRN

## 2024-05-24 NOTE — PROGRESS NOTES
Neuro Office Visit      Encounter Date: 2024   Patient Name: Meet Santos  : 1995   MRN: [id number]   PCP: Dr Lance  Chief Complaint:    Chief Complaint   Patient presents with    Seizures       History of Present Illness: Meet Santos is a 29 y.o. male who is here today in Neurology for  seizures.      Last visit 2024 w me-Inc depakote ER to 500mg 3 tabs q hs, check labs.    CBC-stable  Valproic acid 54      Seizures  Pt presents with 2 seizures since last visit.  24. Sz in parking lot at work. 3 minutes of being rigid with eye rolling and foaming. Sustained a head injury requiring staples. He had GI virus prior to episode. Mother explains he was not taking full dose of depakote at that time. CTH-neg in ER  Had a second seizure 24. Pt became rigid with eye rolling, foaming with 3-4 minute duration. No injury  Pt also had a spontaneous pneumothorax on left not related to seizures. CT is now out.    He denies any vision changes, headaches, numbness or tingling. He is more anxious.  [place] forms completed for work.         Medication:Depakote  2 q hs  Compliance: rare missed dose  Side effects:none  Last seizure:May 18, 2024  Aura:dizziness  Triggers:not sleeping  Description:becomes rigid, no jerking, eye rolling, or tongue biting. No bladder changes, duration < 5 min, postictal     PH (copied from last note)  First seizure in 2021. Sitting at desk, felt dizzy, rigid and fell out of chair. No jerking. Eye rolling, no tongue biting. No bladder incontinence. Duration less than 5 minutes. Postictal. Did not go to ER  FAll  had sz witnessed by fiance. No missed meds       MRI Brain With & Without Contrast (2022 18:12)-IMPRESSION:  Left periventricular heterotopic gray matter as on prior exam. No new  intracranial disease is seen.  MRI: nodular lesions left post ventricle     EEG: Left temporal sharp wavesEEG (Hospital Performed) (2021  08:30)        Meds: Keppra-irritability, Depakote ER 1500mg        Headaches  No further severe headaches. Symptoms relieved with IBU.  Days per month: 1-2/month  Location: left crown      Quality: Pressure        Duration: few hours  Severity: mild  Triggers:   Associated Symptoms: Photophobia and denies nausea, vomiting, phonophobia, vision changes  Aura: none        Abortives: IBU, sleep   Preventives: Depakote                                                                            PH  Onset 2022     Abnormal Eyelid movements  Abnormal eyelid movements have resolved.  Chronic left upper lid closes slowly at times. No vision changes. Recent eye exam is normal. Denies diplopia and eye pain.     PMH: seasonal allergies.Seizure disorder  Subjective      Past Medical History:   Past Medical History:   Diagnosis Date    Seizures        Past Surgical History:   Past Surgical History:   Procedure Laterality Date    NO PAST SURGERIES         Family History:   Family History   Problem Relation Age of Onset    No Known Problems Mother         RN    Heart disease Father     No Known Problems Brother     Asthma Maternal Grandmother     No Known Problems Maternal Grandfather        Social History:   Social History     Socioeconomic History    Marital status: Single   Tobacco Use    Smoking status: Every Day     Current packs/day: 1.00     Average packs/day: 1 pack/day for 5.0 years (5.0 ttl pk-yrs)     Types: Cigarettes     Passive exposure: Never    Smokeless tobacco: Never    Tobacco comments:     vaping currently    Vaping Use    Vaping status: Every Day    Substances: Nicotine, Flavoring    Devices: Disposable   Substance and Sexual Activity    Alcohol use: Not Currently    Drug use: Yes     Types: Marijuana    Sexual activity: Defer       Medications:     Current Outpatient Medications:     divalproex (Depakote ER) 500 MG 24 hr tablet, Take 3 tablets, Disp: 90 tablet, Rfl: 5    fluticasone (FLONASE) 50 MCG/ACT nasal  "spray, 2 sprays into the nostril(s) as directed by provider As Needed., Disp: , Rfl:     lacosamide (VIMPAT) 100 MG tablet tablet, Take 1 tablet by mouth Every 12 (Twelve) Hours., Disp: 60 tablet, Rfl: 5    Allergies:   No Known Allergies    PHQ-9 Total Score:     CARMELTJ Fall Risk Assessment has not been completed.    Objective     Physical Exam:   Physical Exam  Neurological:      Mental Status: He is oriented to person, place, and time.      Coordination: Heel to Shin Test normal.      Gait: Gait is intact.   Psychiatric:         Speech: Speech normal.         Neurologic Exam     Mental Status   Oriented to person, place, and time.   Follows 3 step commands.   Attention: normal. Concentration: normal.   Speech: speech is normal   Level of consciousness: alert  Knowledge: consistent with education.   Normal comprehension.     Cranial Nerves     CN III, IV, VI   CN VI: no CN VI palsy  Upgaze: normal  Downgaze: normal  Conjugate gaze: present    CN VII   Facial expression full, symmetric.     CN VIII   Hearing: intact  Head wound is well healed     Motor Exam   Muscle bulk: normal  Overall muscle tone: normal    Strength   Right biceps: 5/5  Left biceps: 5/5  Right triceps: 5/5  Left triceps: 5/5  Right interossei: 5/5  Left interossei: 5/5  Right quadriceps: 5/5  Left quadriceps: 5/5  Right anterior tibial: 5/5  Left anterior tibial: 5/5  Right posterior tibial: 5/5  Left posterior tibial: 5/5    Sensory Exam   Light touch normal.     Gait, Coordination, and Reflexes     Gait  Gait: normal    Coordination   Heel to shin coordination: normal    Tremor   Resting tremor: absent  Action tremor: absent       Vital Signs:   Vitals:    05/24/24 1249   BP: 120/68   Pulse: 76   SpO2: 98%   Weight: 59.4 kg (131 lb)   Height: 177.8 cm (70\")     Body mass index is 18.8 kg/m².         Assessment / Plan      Assessment/Plan:   Diagnoses and all orders for this visit:    1. Seizure disorder (Primary)  Comments:  no driving for 90 " days. Add vimpat to depakote  Orders:  -     MRI Brain With & Without Contrast; Future  -     lacosamide (VIMPAT) 100 MG tablet tablet; Take 1 tablet by mouth Every 12 (Twelve) Hours.  Dispense: 60 tablet; Refill: 5    2. Traumatic injury of head, subsequent encounter  -     MRI Brain With & Without Contrast; Future       Pt to send copy of labs and CTH obtained in ED.      Patient Education:   I have instructed and given the patient education on the importance of not driving and operating heavy machinery. No solo bathing or tub baths for 3 months from onset of the most recent seizure.   Minimize stress as much as possible. I have recommended 7-8 hours of sleep each night. Abstain from alcohol intake. Educated on Antiepileptic medications with possible side effects and signs and symptoms to report if prescribed during visit. Instructed to take seizure medication daily if prescribed. Reviewed potential seizure risk factors.   I have instructed the patient to call 911 or our office if another seizure does occur. Patient verbalizes and understands.         Reviewed medications, potential side effects and signs and symptoms to report. Discussed risk versus benefits of treatment plan with patient and/or family-including medications, labs and radiology that may be ordered. Addressed questions and concerns during visit. Patient and/or family verbalized understanding and agree with plan. Instructed to call the office with any questions and report to ER with any life-threatening symptoms.     Follow Up:   Return in about 3 months (around 8/24/2024) for Recheck.    During this visit the following were done:  Labs Reviewed []    Labs Ordered []    Radiology Reports Reviewed []    Radiology Ordered []    PCP Records Reviewed []    Referring Provider Records Reviewed []    ER Records Reviewed []    Hospital Records Reviewed []    History Obtained From Family [x]    Radiology Images Reviewed []    Other Reviewed [x]    Records  Requested []      Yamile Chester, DNP, APRN

## 2024-06-17 ENCOUNTER — TELEPHONE (OUTPATIENT)
Dept: NEUROLOGY | Facility: CLINIC | Age: 29
End: 2024-06-17

## 2024-06-17 NOTE — TELEPHONE ENCOUNTER
Caller: TASHA HILL    Relationship: Emergency Contact    Best call back number: [phone]    What was the call regarding: THEY RECEIVED A LETTER FROM THE INS STATED THE MRI WAS DENIED. THEY WANT TO KNOW IF THERE IS AN APPEAL PROCESS AND IF IT CAN BE DONE BEFORE THE MRI APPT ON 6/24.    PLEASE ADVISE  THANK YOU

## 2024-06-24 ENCOUNTER — HOSPITAL ENCOUNTER (OUTPATIENT)
Dept: MRI IMAGING | Facility: HOSPITAL | Age: 29
Discharge: HOME | End: 2024-06-24
Admitting: NURSE PRACTITIONER
Payer: COMMERCIAL

## 2024-06-24 DIAGNOSIS — G40.909 SEIZURE DISORDER: ICD-10-CM

## 2024-06-24 DIAGNOSIS — S09.90XD TRAUMATIC INJURY OF HEAD, SUBSEQUENT ENCOUNTER: ICD-10-CM

## 2024-06-24 PROCEDURE — A9577 INJ MULTIHANCE: HCPCS | Performed by: NURSE PRACTITIONER

## 2024-06-24 PROCEDURE — 0 GADOBENATE DIMEGLUMINE 529 MG/ML SOLUTION: Performed by: NURSE PRACTITIONER

## 2024-06-24 PROCEDURE — 70553 MRI BRAIN STEM W/O & W/DYE: CPT

## 2024-06-24 RX ADMIN — GADOBENATE DIMEGLUMINE 12 ML: 529 INJECTION, SOLUTION INTRAVENOUS at 12:03

## 2024-06-25 ENCOUNTER — TELEPHONE (OUTPATIENT)
Dept: NEUROLOGY | Facility: CLINIC | Age: 29
End: 2024-06-25
Payer: COMMERCIAL

## 2024-06-25 NOTE — TELEPHONE ENCOUNTER
Called patient and left vm.    ----- Message from Yamile Chester sent at 6/25/2024  3:33 PM EDT -----  Please notify pt MRI is stable with no concerning findings.

## 2024-07-02 ENCOUNTER — OFFICE VISIT (OUTPATIENT)
Dept: NEUROLOGY | Facility: CLINIC | Age: 29
End: 2024-07-02
Payer: COMMERCIAL

## 2024-07-02 VITALS
HEIGHT: 70 IN | WEIGHT: 146 LBS | DIASTOLIC BLOOD PRESSURE: 58 MMHG | SYSTOLIC BLOOD PRESSURE: 118 MMHG | HEART RATE: 76 BPM | BODY MASS INDEX: 20.9 KG/M2 | OXYGEN SATURATION: 98 %

## 2024-07-02 DIAGNOSIS — G40.909 SEIZURE DISORDER: Primary | ICD-10-CM

## 2024-07-02 DIAGNOSIS — S09.90XD TRAUMATIC INJURY OF HEAD, SUBSEQUENT ENCOUNTER: ICD-10-CM

## 2024-07-02 DIAGNOSIS — G44.209 TENSION-TYPE HEADACHE, NOT INTRACTABLE, UNSPECIFIED CHRONICITY PATTERN: ICD-10-CM

## 2024-07-02 PROBLEM — J93.83 SPONTANEOUS PNEUMOTHORAX: Status: ACTIVE | Noted: 2024-05-22

## 2024-07-02 PROCEDURE — 99214 OFFICE O/P EST MOD 30 MIN: CPT | Performed by: NURSE PRACTITIONER

## 2024-07-02 NOTE — PROGRESS NOTES
Neuro Office Visit      Encounter Date: 2024   Patient Name: Meet Santos  : 1995   MRN: [id number]   PCP: Dr aLnce  Chief Complaint:    Chief Complaint   Patient presents with    Seizures       History of Present Illness: Meet Santos is a 29 y.o. male who is here today in Neurology for  seizures, headaches, abnormal eye movements. Here w grandmother.    Last visit 2024 w me-MRI brain, vimpat 100 bid.  MRI Brain With & Without Contrast (2024 12:03)-neg my review  XR OUTSIDE IMAGES (2024 18:46)     History of Present Illness           Seizures  Pt seizure free since last visit. Compliant with meds. SE of lethargy. No other complaints.       Medication:Depakote  3q hs, Vimpat 100 bid  Compliance: rare missed dose  Side effects:none  Last seizure:May 18, 2024  Aura:dizziness  Triggers:not sleeping  Description:becomes rigid, no jerking, eye rolling, or tongue biting. No bladder changes, duration < 5 min, postictal     PH (copied from last note)  First seizure in 2021. Sitting at desk, felt dizzy, rigid and fell out of chair. No jerking. Eye rolling, no tongue biting. No bladder incontinence. Duration less than 5 minutes. Postictal. Did not go to ER  FAll  had sz witnessed by fiance. No missed meds    24. Sz in parking lot at work. 3 minutes of being rigid with eye rolling and foaming. Sustained a head injury requiring staples. He had GI virus prior to episode. Mother explains he was not taking full dose of depakote at that time. CTH-neg in ER  Had a second seizure 24. Pt became rigid with eye rolling, foaming with 3-4 minute duration. No injury        MRI Brain With & Without Contrast (2022 18:12)-IMPRESSION:  Left periventricular heterotopic gray matter as on prior exam. No new  intracranial disease is seen.  MRI: nodular lesions left post ventricle     EEG: Left temporal sharp wavesEEG (Hospital Performed) (2021  08:30)        Meds: Keppra-irritability, Depakote ER 1500mg        Headaches  Resolved  No further severe headaches. Symptoms relieved with IBU.  Days per month: 1-2/month  Location: left crown      Quality: Pressure        Duration: few hours  Severity: mild  Triggers:   Associated Symptoms: Photophobia and denies nausea, vomiting, phonophobia, vision changes  Aura: none        Abortives: IBU, sleep   Preventives: Depakote                                                                            PH  Onset 2022     Abnormal Eyelid movements  Abnormal eyelid movements have resolved.  Chronic left upper lid closes slowly at times. No vision changes. Recent eye exam is normal. Denies diplopia and eye pain.     PMH: seasonal allergies.Seizure disorder, spontaneous pneumothorax  Subjective      Past Medical History:   Past Medical History:   Diagnosis Date    Seizures        Past Surgical History:   Past Surgical History:   Procedure Laterality Date    NO PAST SURGERIES         Family History:   Family History   Problem Relation Age of Onset    No Known Problems Mother         RN    Heart disease Father     No Known Problems Brother     Asthma Maternal Grandmother     No Known Problems Maternal Grandfather        Social History:   Social History     Socioeconomic History    Marital status: Single   Tobacco Use    Smoking status: Every Day     Current packs/day: 1.00     Average packs/day: 1 pack/day for 5.0 years (5.0 ttl pk-yrs)     Types: Cigarettes     Passive exposure: Never    Smokeless tobacco: Never    Tobacco comments:     vaping currently    Vaping Use    Vaping status: Every Day    Substances: Nicotine, Flavoring    Devices: Disposable   Substance and Sexual Activity    Alcohol use: Not Currently    Drug use: Yes     Types: Marijuana    Sexual activity: Defer       Medications:     Current Outpatient Medications:     divalproex (Depakote ER) 500 MG 24 hr tablet, Take 3 tablets, Disp: 90 tablet, Rfl: 5     fluticasone (FLONASE) 50 MCG/ACT nasal spray, 2 sprays into the nostril(s) as directed by provider As Needed., Disp: , Rfl:     lacosamide (VIMPAT) 100 MG tablet tablet, Take 1 tablet by mouth Every 12 (Twelve) Hours., Disp: 60 tablet, Rfl: 5    Allergies:   No Known Allergies    PHQ-9 Total Score:     STEADI Fall Risk Assessment has not been completed.    Objective     Physical Exam:   Physical Exam  Neurological:      Mental Status: He is oriented to person, place, and time.      Gait: Gait is intact.      Deep Tendon Reflexes:      Reflex Scores:       Tricep reflexes are 2+ on the right side and 2+ on the left side.       Bicep reflexes are 2+ on the right side and 2+ on the left side.       Brachioradialis reflexes are 2+ on the right side and 2+ on the left side.       Patellar reflexes are 2+ on the right side and 2+ on the left side.       Achilles reflexes are 2+ on the right side and 2+ on the left side.  Psychiatric:         Speech: Speech normal.         Neurologic Exam     Mental Status   Oriented to person, place, and time.   Follows 3 step commands.   Attention: normal. Concentration: normal.   Speech: speech is normal   Level of consciousness: alert  Knowledge: consistent with education.   Normal comprehension.     Cranial Nerves     CN III, IV, VI   Left pupil: Accommodation: intact.   CN III: no CN III palsy  CN VI: no CN VI palsy  Nystagmus: none   Diplopia: none  Upgaze: normal  Downgaze: normal  Conjugate gaze: present    CN VII   Facial expression full, symmetric.     CN VIII   Hearing: intact    CN XII   CN XII normal.     Motor Exam   Muscle bulk: normal  Overall muscle tone: normal    Strength   Right biceps: 5/5  Left biceps: 5/5  Right triceps: 5/5  Left triceps: 5/5  Right interossei: 5/5  Left interossei: 5/5  Right quadriceps: 5/5  Left quadriceps: 5/5  Right anterior tibial: 5/5  Left anterior tibial: 5/5  Right posterior tibial: 5/5  Left posterior tibial: 5/5    Sensory Exam   Light  "touch normal.     Gait, Coordination, and Reflexes     Gait  Gait: normal    Tremor   Resting tremor: absent  Action tremor: absent    Reflexes   Right brachioradialis: 2+  Left brachioradialis: 2+  Right biceps: 2+  Left biceps: 2+  Right triceps: 2+  Left triceps: 2+  Right patellar: 2+  Left patellar: 2+  Right achilles: 2+  Left achilles: 2+  Right : 2+  Left : 2+     Physical Exam        Vital Signs:   Vitals:    07/02/24 0814   BP: 118/58   Pulse: 76   SpO2: 98%   Weight: 66.2 kg (146 lb)   Height: 177.8 cm (70\")     Body mass index is 20.95 kg/m².         Assessment / Plan      Assessment/Plan:   Diagnoses and all orders for this visit:    1. Seizure disorder (Primary)  Comments:  Cont vimpat 100 bid with depakote ER 500mg 3 q hs    2. Traumatic injury of head, subsequent encounter  Comments:  healed    3. Tension-type headache, not intractable, unspecified chronicity pattern  Comments:  resolved       Assessment & Plan          Patient Education:   I have instructed and given the patient education on the importance of not driving and operating heavy machinery. No solo bathing or tub baths for 3 months from onset of the most recent seizure.   Minimize stress as much as possible. I have recommended 7-8 hours of sleep each night. Abstain from alcohol intake. Educated on Antiepileptic medications with possible side effects and signs and symptoms to report if prescribed during visit. Instructed to take seizure medication daily if prescribed. Reviewed potential seizure risk factors.   I have instructed the patient to call 911 or our office if another seizure does occur. Patient verbalizes and understands.         Reviewed medications, potential side effects and signs and symptoms to report. Discussed risk versus benefits of treatment plan with patient and/or family-including medications, labs and radiology that may be ordered. Addressed questions and concerns during visit. Patient and/or family verbalized " understanding and agree with plan. Instructed to call the office with any questions and report to ER with any life-threatening symptoms.     Follow Up:   Return in about 4 months (around 11/2/2024) for Recheck.    During this visit the following were done:  Labs Reviewed [x]    Labs Ordered []    Radiology Reports Reviewed [x]    Radiology Ordered []    PCP Records Reviewed []    Referring Provider Records Reviewed []    ER Records Reviewed []    Hospital Records Reviewed []    History Obtained From Family [x]    Radiology Images Reviewed [x]    Other Reviewed []    Records Requested []      Patient or patient representative verbalized consent for the use of Ambient Listening during the visit with  Yamile Chester DNP, APRN for chart documentation. 7/2/2024  08:30 EDT      aYmile Chester DNP, APRN

## 2024-07-02 NOTE — LETTER
2024     Giuliana Lance MD  [address]  [address]  [facility] [address]    Patient: Meet Santos   YOB: 1995   Date of Visit: 2024     Dear Giuliana Lance MD:       Thank you for referring Meet Santos to me for evaluation. Below are the relevant portions of my assessment and plan of care.    If you have questions, please do not hesitate to call me. I look forward to following Meet along with you.         Sincerely,        Yamile Chester DNP, APRN        CC: No Recipients    Yamile Chester DNP, APRN  [phone]  Signed     Neuro Office Visit      Encounter Date: 2024   Patient Name: Meet Santos  : 1995   MRN: [id number]   PCP: Dr Lance  Chief Complaint:    Chief Complaint   Patient presents with   • Seizures       History of Present Illness: Meet Santos is a 29 y.o. male who is here today in Neurology for  seizures, headaches, abnormal eye movements. Here w grandmother.    Last visit 2024 w me-MRI brain, vimpat 100 bid.  MRI Brain With & Without Contrast (2024 12:03)-neg my review  XR OUTSIDE IMAGES (2024 18:46)     History of Present Illness           Seizures  Pt seizure free since last visit. Compliant with meds. SE of lethargy. No other complaints.       Medication:Depakote  3q hs, Vimpat 100 bid  Compliance: rare missed dose  Side effects:none  Last seizure:May 18, 2024  Aura:dizziness  Triggers:not sleeping  Description:becomes rigid, no jerking, eye rolling, or tongue biting. No bladder changes, duration < 5 min, postictal     PH (copied from last note)  First seizure in 2021. Sitting at desk, felt dizzy, rigid and fell out of chair. No jerking. Eye rolling, no tongue biting. No bladder incontinence. Duration less than 5 minutes. Postictal. Did not go to ER  FAll  had sz witnessed by fiance. No missed meds    24. Sz in parking lot at work. 3 minutes of being  rigid with eye rolling and foaming. Sustained a head injury requiring staples. He had GI virus prior to episode. Mother explains he was not taking full dose of depakote at that time. CTH-neg in ER  Had a second seizure 5/18/24. Pt became rigid with eye rolling, foaming with 3-4 minute duration. No injury        MRI Brain With & Without Contrast (08/29/2022 18:12)-IMPRESSION:  Left periventricular heterotopic gray matter as on prior exam. No new  intracranial disease is seen.  MRI: nodular lesions left post ventricle     EEG: Left temporal sharp wavesEEG (Hospital Performed) (09/08/2021 08:30)        Meds: Keppra-irritability, Depakote ER 1500mg        Headaches  Resolved  No further severe headaches. Symptoms relieved with IBU.  Days per month: 1-2/month  Location: left crown      Quality: Pressure        Duration: few hours  Severity: mild  Triggers:   Associated Symptoms: Photophobia and denies nausea, vomiting, phonophobia, vision changes  Aura: none        Abortives: IBU, sleep   Preventives: Depakote                                                                            PH  Onset 2022     Abnormal Eyelid movements  Abnormal eyelid movements have resolved.  Chronic left upper lid closes slowly at times. No vision changes. Recent eye exam is normal. Denies diplopia and eye pain.     PMH: seasonal allergies.Seizure disorder, spontaneous pneumothorax  Subjective      Past Medical History:   Past Medical History:   Diagnosis Date   • Seizures        Past Surgical History:   Past Surgical History:   Procedure Laterality Date   • NO PAST SURGERIES         Family History:   Family History   Problem Relation Age of Onset   • No Known Problems Mother         RN   • Heart disease Father    • No Known Problems Brother    • Asthma Maternal Grandmother    • No Known Problems Maternal Grandfather        Social History:   Social History     Socioeconomic History   • Marital status: Single   Tobacco Use   • Smoking status:  Every Day     Current packs/day: 1.00     Average packs/day: 1 pack/day for 5.0 years (5.0 ttl pk-yrs)     Types: Cigarettes     Passive exposure: Never   • Smokeless tobacco: Never   • Tobacco comments:     vaping currently    Vaping Use   • Vaping status: Every Day   • Substances: Nicotine, Flavoring   • Devices: Disposable   Substance and Sexual Activity   • Alcohol use: Not Currently   • Drug use: Yes     Types: Marijuana   • Sexual activity: Defer       Medications:     Current Outpatient Medications:   •  divalproex (Depakote ER) 500 MG 24 hr tablet, Take 3 tablets, Disp: 90 tablet, Rfl: 5  •  fluticasone (FLONASE) 50 MCG/ACT nasal spray, 2 sprays into the nostril(s) as directed by provider As Needed., Disp: , Rfl:   •  lacosamide (VIMPAT) 100 MG tablet tablet, Take 1 tablet by mouth Every 12 (Twelve) Hours., Disp: 60 tablet, Rfl: 5    Allergies:   No Known Allergies    PHQ-9 Total Score:     STEADI Fall Risk Assessment has not been completed.    Objective     Physical Exam:   Physical Exam  Neurological:      Mental Status: He is oriented to person, place, and time.      Gait: Gait is intact.      Deep Tendon Reflexes:      Reflex Scores:       Tricep reflexes are 2+ on the right side and 2+ on the left side.       Bicep reflexes are 2+ on the right side and 2+ on the left side.       Brachioradialis reflexes are 2+ on the right side and 2+ on the left side.       Patellar reflexes are 2+ on the right side and 2+ on the left side.       Achilles reflexes are 2+ on the right side and 2+ on the left side.  Psychiatric:         Speech: Speech normal.         Neurologic Exam     Mental Status   Oriented to person, place, and time.   Follows 3 step commands.   Attention: normal. Concentration: normal.   Speech: speech is normal   Level of consciousness: alert  Knowledge: consistent with education.   Normal comprehension.     Cranial Nerves     CN III, IV, VI   Left pupil: Accommodation: intact.   CN III: no CN III  "palsy  CN VI: no CN VI palsy  Nystagmus: none   Diplopia: none  Upgaze: normal  Downgaze: normal  Conjugate gaze: present    CN VII   Facial expression full, symmetric.     CN VIII   Hearing: intact    CN XII   CN XII normal.     Motor Exam   Muscle bulk: normal  Overall muscle tone: normal    Strength   Right biceps: 5/5  Left biceps: 5/5  Right triceps: 5/5  Left triceps: 5/5  Right interossei: 5/5  Left interossei: 5/5  Right quadriceps: 5/5  Left quadriceps: 5/5  Right anterior tibial: 5/5  Left anterior tibial: 5/5  Right posterior tibial: 5/5  Left posterior tibial: 5/5    Sensory Exam   Light touch normal.     Gait, Coordination, and Reflexes     Gait  Gait: normal    Tremor   Resting tremor: absent  Action tremor: absent    Reflexes   Right brachioradialis: 2+  Left brachioradialis: 2+  Right biceps: 2+  Left biceps: 2+  Right triceps: 2+  Left triceps: 2+  Right patellar: 2+  Left patellar: 2+  Right achilles: 2+  Left achilles: 2+  Right : 2+  Left : 2+     Physical Exam        Vital Signs:   Vitals:    07/02/24 0814   BP: 118/58   Pulse: 76   SpO2: 98%   Weight: 66.2 kg (146 lb)   Height: 177.8 cm (70\")     Body mass index is 20.95 kg/m².         Assessment / Plan      Assessment/Plan:   Diagnoses and all orders for this visit:    1. Seizure disorder (Primary)  Comments:  Cont vimpat 100 bid with depakote ER 500mg 3 q hs    2. Traumatic injury of head, subsequent encounter  Comments:  healed    3. Tension-type headache, not intractable, unspecified chronicity pattern  Comments:  resolved       Assessment & Plan          Patient Education:   I have instructed and given the patient education on the importance of not driving and operating heavy machinery. No solo bathing or tub baths for 3 months from onset of the most recent seizure.   Minimize stress as much as possible. I have recommended 7-8 hours of sleep each night. Abstain from alcohol intake. Educated on Antiepileptic medications with " possible side effects and signs and symptoms to report if prescribed during visit. Instructed to take seizure medication daily if prescribed. Reviewed potential seizure risk factors.   I have instructed the patient to call 911 or our office if another seizure does occur. Patient verbalizes and understands.         Reviewed medications, potential side effects and signs and symptoms to report. Discussed risk versus benefits of treatment plan with patient and/or family-including medications, labs and radiology that may be ordered. Addressed questions and concerns during visit. Patient and/or family verbalized understanding and agree with plan. Instructed to call the office with any questions and report to ER with any life-threatening symptoms.     Follow Up:   Return in about 4 months (around 11/2/2024) for Recheck.    During this visit the following were done:  Labs Reviewed [x]    Labs Ordered []    Radiology Reports Reviewed [x]    Radiology Ordered []    PCP Records Reviewed []    Referring Provider Records Reviewed []    ER Records Reviewed []    Hospital Records Reviewed []    History Obtained From Family [x]    Radiology Images Reviewed [x]    Other Reviewed []    Records Requested []      Patient or patient representative verbalized consent for the use of Ambient Listening during the visit with  Yamile Chester DNP, APRANGIE for chart documentation. 7/2/2024  08:30 EDT      Yamile Chester DNP, APRN

## 2024-08-26 ENCOUNTER — OFFICE VISIT (OUTPATIENT)
Dept: NEUROLOGY | Facility: CLINIC | Age: 29
End: 2024-08-26
Payer: COMMERCIAL

## 2024-08-26 VITALS
HEIGHT: 70 IN | SYSTOLIC BLOOD PRESSURE: 110 MMHG | BODY MASS INDEX: 22.79 KG/M2 | DIASTOLIC BLOOD PRESSURE: 72 MMHG | WEIGHT: 159.2 LBS | OXYGEN SATURATION: 99 % | HEART RATE: 70 BPM

## 2024-08-26 DIAGNOSIS — G40.909 SEIZURE DISORDER: ICD-10-CM

## 2024-08-26 PROCEDURE — 99213 OFFICE O/P EST LOW 20 MIN: CPT | Performed by: NURSE PRACTITIONER

## 2024-08-26 RX ORDER — DIVALPROEX SODIUM 500 MG/1
TABLET, EXTENDED RELEASE ORAL
Qty: 90 TABLET | Refills: 5 | Status: SHIPPED | OUTPATIENT
Start: 2024-08-26

## 2024-08-26 RX ORDER — LACOSAMIDE 100 MG/1
100 TABLET ORAL EVERY 12 HOURS SCHEDULED
Qty: 60 TABLET | Refills: 5 | Status: SHIPPED | OUTPATIENT
Start: 2024-08-26

## 2024-08-26 NOTE — LETTER
2024     Giuliana Lance MD  [address]  [address]  [facility] [address]    Patient: Meet Santos   YOB: 1995   Date of Visit: 2024     Dear Giuliana Lance MD:       Thank you for referring Meet Santos to me for evaluation. Below are the relevant portions of my assessment and plan of care.    If you have questions, please do not hesitate to call me. I look forward to following Meet along with you.         Sincerely,        Yamile Chester DNP, HALLIE        CC: No Recipients    Yamile Chester DNP, HALLIE  [phone]  Signed     Neuro Office Visit      Encounter Date: 2024   Patient Name: Meet Santos  : 1995   MRN: [id number]     Chief Complaint:    Chief Complaint   Patient presents with   • Seizures       History of Present Illness: Meet Santos is a 29 y.o. male who is here today in Neurology for  seizures      Last visit 2024 w me-cont vimpat with depakote  3 q hs  History of Present Illness  The patient presents for a follow-up visit.    He reports no current health issues. His medication regimen is well-tolerated, with no known side effects. He has not experienced any seizures. His overall condition is satisfactory, as evidenced by his regular gym attendance and continued employment. He is due for a refill of his Depakote prescription.     Seizures  Pt seizure free since last visit. Compliant with meds. SE of lethargy. No other complaints.        Medication:Depakote  3q hs, Vimpat 100 bid  Compliance: rare missed dose  Side effects:none  Last seizure:May 18, 2024  Aura:dizziness  Triggers:not sleeping  Description:becomes rigid, no jerking, eye rolling, or tongue biting. No bladder changes, duration < 5 min, postictal     PH (copied from last note)  First seizure in 2021. Sitting at desk, felt dizzy, rigid and fell out of chair. No jerking. Eye rolling, no tongue biting. No  bladder incontinence. Duration less than 5 minutes. Postictal. Did not go to ER  FAll 2023 had sz witnessed by fiashley. No missed meds     4/16/24. Sz in parking lot at work. 3 minutes of being rigid with eye rolling and foaming. Sustained a head injury requiring staples. He had GI virus prior to episode. Mother explains he was not taking full dose of depakote at that time. CTH-neg in ER  Had a second seizure 5/18/24. Pt became rigid with eye rolling, foaming with 3-4 minute duration. No injury        MRI Brain With & Without Contrast (08/29/2022 18:12)-IMPRESSION:  Left periventricular heterotopic gray matter as on prior exam. No new  intracranial disease is seen.  MRI: nodular lesions left post ventricle     EEG: Left temporal sharp wavesEEG (Hospital Performed) (09/08/2021 08:30)        Meds: Keppra-irritability, Depakote ER 1500mg        Headaches  Resolved  No further severe headaches. Symptoms relieved with IBU.  Days per month: 1-2/month  Location: left crown      Quality: Pressure        Duration: few hours  Severity: mild  Triggers:   Associated Symptoms: Photophobia and denies nausea, vomiting, phonophobia, vision changes  Aura: none        Abortives: IBU, sleep   Preventives: Depakote                                                                            PH  Onset 2022     Abnormal Eyelid movements  Abnormal eyelid movements have resolved. Very mild occasional slow horizontal nystagmus  Chronic left upper lid closes slowly at times. No vision changes. Recent eye exam is normal. Denies diplopia and eye pain.     PMH: seasonal allergies.Seizure disorder, spontaneous pneumothorax  Subjective      Past Medical History:   Past Medical History:   Diagnosis Date   • Seizures        Past Surgical History:   Past Surgical History:   Procedure Laterality Date   • NO PAST SURGERIES         Family History:   Family History   Problem Relation Age of Onset   • No Known Problems Mother         RN   • Heart disease  Father    • No Known Problems Brother    • Asthma Maternal Grandmother    • No Known Problems Maternal Grandfather        Social History:   Social History     Socioeconomic History   • Marital status: Single   Tobacco Use   • Smoking status: Every Day     Current packs/day: 1.00     Average packs/day: 1 pack/day for 5.0 years (5.0 ttl pk-yrs)     Types: Cigarettes     Passive exposure: Never   • Smokeless tobacco: Never   • Tobacco comments:     vaping currently    Vaping Use   • Vaping status: Every Day   • Substances: Nicotine, Flavoring   • Devices: Disposable   Substance and Sexual Activity   • Alcohol use: Not Currently   • Drug use: Yes     Types: Marijuana   • Sexual activity: Defer       Medications:     Current Outpatient Medications:   •  divalproex (Depakote ER) 500 MG 24 hr tablet, Take 3 tablets, Disp: 90 tablet, Rfl: 5  •  fluticasone (FLONASE) 50 MCG/ACT nasal spray, 2 sprays into the nostril(s) as directed by provider As Needed., Disp: , Rfl:   •  lacosamide (VIMPAT) 100 MG tablet tablet, Take 1 tablet by mouth Every 12 (Twelve) Hours., Disp: 60 tablet, Rfl: 5    Allergies:   No Known Allergies    PHQ-9 Total Score:     STEADI Fall Risk Assessment has not been completed.    Objective     Physical Exam:   Physical Exam  Neurological:      Mental Status: He is oriented to person, place, and time.      Gait: Gait is intact.      Deep Tendon Reflexes:      Reflex Scores:       Tricep reflexes are 2+ on the right side and 2+ on the left side.       Bicep reflexes are 2+ on the right side and 2+ on the left side.       Brachioradialis reflexes are 2+ on the right side and 2+ on the left side.       Patellar reflexes are 2+ on the right side and 2+ on the left side.       Achilles reflexes are 2+ on the right side and 2+ on the left side.  Psychiatric:         Speech: Speech normal.         Neurologic Exam     Mental Status   Oriented to person, place, and time.   Follows 3 step commands.   Attention:  "normal. Concentration: normal.   Speech: speech is normal   Level of consciousness: alert  Knowledge: consistent with education.   Normal comprehension.     Cranial Nerves     CN III, IV, VI   CN III: no CN III palsy  CN VI: no CN VI palsy  Nystagmus: left   Nystagmus type: slow and horizontal  Diplopia: none  Upgaze: normal  Downgaze: normal  Conjugate gaze: present    CN VII   Facial expression full, symmetric.     CN VIII   Hearing: intact    CN XII   CN XII normal.     Motor Exam   Muscle bulk: normal  Overall muscle tone: normal    Strength   Right biceps: 5/5  Left biceps: 5/5  Right triceps: 5/5  Left triceps: 5/5  Right interossei: 5/5  Left interossei: 5/5  Right quadriceps: 5/5  Left quadriceps: 5/5  Right anterior tibial: 5/5  Left anterior tibial: 5/5  Right posterior tibial: 5/5  Left posterior tibial: 5/5    Sensory Exam   Light touch normal.     Gait, Coordination, and Reflexes     Gait  Gait: normal    Tremor   Resting tremor: absent  Action tremor: absent    Reflexes   Right brachioradialis: 2+  Left brachioradialis: 2+  Right biceps: 2+  Left biceps: 2+  Right triceps: 2+  Left triceps: 2+  Right patellar: 2+  Left patellar: 2+  Right achilles: 2+  Left achilles: 2+  Right : 2+  Left : 2+     Physical Exam        Vital Signs:   Vitals:    08/26/24 0930   BP: 110/72   Pulse: 70   SpO2: 99%   Weight: 72.2 kg (159 lb 3.2 oz)   Height: 177.8 cm (70\")     Body mass index is 22.84 kg/m².         Assessment / Plan      Assessment/Plan:   Diagnoses and all orders for this visit:    1. Seizure disorder  Comments:  Increase dose of Depakote  to 3 tabs q hs. Check labs.  Orders:  -     divalproex (Depakote ER) 500 MG 24 hr tablet; Take 3 tablets  Dispense: 90 tablet; Refill: 5  -     lacosamide (VIMPAT) 100 MG tablet tablet; Take 1 tablet by mouth Every 12 (Twelve) Hours.  Dispense: 60 tablet; Refill: 5    2. Seizure disorder  Comments:  no driving for 90 days. Add vimpat to depakote  Orders:  - "     divalproex (Depakote ER) 500 MG 24 hr tablet; Take 3 tablets  Dispense: 90 tablet; Refill: 5  -     lacosamide (VIMPAT) 100 MG tablet tablet; Take 1 tablet by mouth Every 12 (Twelve) Hours.  Dispense: 60 tablet; Refill: 5       Labs next appt. CSC next appt.  Assessment & Plan  1. Seizure Disorder.  No seizures have been reported since the last visit. The current medication regimen appears to be effective without any noted side effects. Depakote 500 mg has been refilled. It is important to ensure that the medication is not missed to prevent seizure recurrence. Blood work will be conducted at the next visit to monitor medication levels and overall health.    Follow-up  A follow-up visit is scheduled in 6 months.        Patient Education:       Reviewed medications, potential side effects and signs and symptoms to report. Discussed risk versus benefits of treatment plan with patient and/or family-including medications, labs and radiology that may be ordered. Addressed questions and concerns during visit. Patient and/or family verbalized understanding and agree with plan. Instructed to call the office with any questions and report to ER with any life-threatening symptoms.     Follow Up:   Return in about 6 months (around 2/26/2025).    During this visit the following were done:  Labs Reviewed []    Labs Ordered []    Radiology Reports Reviewed []    Radiology Ordered []    PCP Records Reviewed []    Referring Provider Records Reviewed []    ER Records Reviewed []    Hospital Records Reviewed []    History Obtained From Family []    Radiology Images Reviewed []    Other Reviewed [x]    Records Requested []      Patient or patient representative verbalized consent for the use of Ambient Listening during the visit with  Yamile Chester DNP, APRN for chart documentation. 8/26/2024  09:41 EDT      Yamile Chester DNP, APRN

## 2024-08-26 NOTE — PROGRESS NOTES
Neuro Office Visit      Encounter Date: 2024   Patient Name: Meet Santos  : 1995   MRN: [id number]     Chief Complaint:    Chief Complaint   Patient presents with    Seizures       History of Present Illness: Meet Santos is a 29 y.o. male who is here today in Neurology for  seizures      Last visit 2024 w me-cont vimpat with depakote  3 q hs  History of Present Illness  The patient presents for a follow-up visit.    He reports no current health issues. His medication regimen is well-tolerated, with no known side effects. He has not experienced any seizures. His overall condition is satisfactory, as evidenced by his regular gym attendance and continued employment. He is due for a refill of his Depakote prescription.     Seizures  Pt seizure free since last visit. Compliant with meds. SE of lethargy. No other complaints.        Medication:Depakote  3q hs, Vimpat 100 bid  Compliance: rare missed dose  Side effects:none  Last seizure:May 18, 2024  Aura:dizziness  Triggers:not sleeping  Description:becomes rigid, no jerking, eye rolling, or tongue biting. No bladder changes, duration < 5 min, postictal     PH (copied from last note)  First seizure in 2021. Sitting at desk, felt dizzy, rigid and fell out of chair. No jerking. Eye rolling, no tongue biting. No bladder incontinence. Duration less than 5 minutes. Postictal. Did not go to ER  FAll  had sz witnessed by fiance. No missed meds     24. Sz in parking lot at work. 3 minutes of being rigid with eye rolling and foaming. Sustained a head injury requiring staples. He had GI virus prior to episode. Mother explains he was not taking full dose of depakote at that time. CTH-neg in ER  Had a second seizure 24. Pt became rigid with eye rolling, foaming with 3-4 minute duration. No injury        MRI Brain With & Without Contrast (2022 18:12)-IMPRESSION:  Left periventricular heterotopic  gray matter as on prior exam. No new  intracranial disease is seen.  MRI: nodular lesions left post ventricle     EEG: Left temporal sharp wavesEEG (Hospital Performed) (09/08/2021 08:30)        Meds: Keppra-irritability, Depakote ER 1500mg        Headaches  Resolved  No further severe headaches. Symptoms relieved with IBU.  Days per month: 1-2/month  Location: left crown      Quality: Pressure        Duration: few hours  Severity: mild  Triggers:   Associated Symptoms: Photophobia and denies nausea, vomiting, phonophobia, vision changes  Aura: none        Abortives: IBU, sleep   Preventives: Depakote                                                                            PH  Onset 2022     Abnormal Eyelid movements  Abnormal eyelid movements have resolved. Very mild occasional slow horizontal nystagmus  Chronic left upper lid closes slowly at times. No vision changes. Recent eye exam is normal. Denies diplopia and eye pain.     PMH: seasonal allergies.Seizure disorder, spontaneous pneumothorax  Subjective      Past Medical History:   Past Medical History:   Diagnosis Date    Seizures        Past Surgical History:   Past Surgical History:   Procedure Laterality Date    NO PAST SURGERIES         Family History:   Family History   Problem Relation Age of Onset    No Known Problems Mother         RN    Heart disease Father     No Known Problems Brother     Asthma Maternal Grandmother     No Known Problems Maternal Grandfather        Social History:   Social History     Socioeconomic History    Marital status: Single   Tobacco Use    Smoking status: Every Day     Current packs/day: 1.00     Average packs/day: 1 pack/day for 5.0 years (5.0 ttl pk-yrs)     Types: Cigarettes     Passive exposure: Never    Smokeless tobacco: Never    Tobacco comments:     vaping currently    Vaping Use    Vaping status: Every Day    Substances: Nicotine, Flavoring    Devices: Disposable   Substance and Sexual Activity    Alcohol use: Not  Currently    Drug use: Yes     Types: Marijuana    Sexual activity: Defer       Medications:     Current Outpatient Medications:     divalproex (Depakote ER) 500 MG 24 hr tablet, Take 3 tablets, Disp: 90 tablet, Rfl: 5    fluticasone (FLONASE) 50 MCG/ACT nasal spray, 2 sprays into the nostril(s) as directed by provider As Needed., Disp: , Rfl:     lacosamide (VIMPAT) 100 MG tablet tablet, Take 1 tablet by mouth Every 12 (Twelve) Hours., Disp: 60 tablet, Rfl: 5    Allergies:   No Known Allergies    PHQ-9 Total Score:     STEADI Fall Risk Assessment has not been completed.    Objective     Physical Exam:   Physical Exam  Neurological:      Mental Status: He is oriented to person, place, and time.      Gait: Gait is intact.      Deep Tendon Reflexes:      Reflex Scores:       Tricep reflexes are 2+ on the right side and 2+ on the left side.       Bicep reflexes are 2+ on the right side and 2+ on the left side.       Brachioradialis reflexes are 2+ on the right side and 2+ on the left side.       Patellar reflexes are 2+ on the right side and 2+ on the left side.       Achilles reflexes are 2+ on the right side and 2+ on the left side.  Psychiatric:         Speech: Speech normal.         Neurologic Exam     Mental Status   Oriented to person, place, and time.   Follows 3 step commands.   Attention: normal. Concentration: normal.   Speech: speech is normal   Level of consciousness: alert  Knowledge: consistent with education.   Normal comprehension.     Cranial Nerves     CN III, IV, VI   CN III: no CN III palsy  CN VI: no CN VI palsy  Nystagmus: left   Nystagmus type: slow and horizontal  Diplopia: none  Upgaze: normal  Downgaze: normal  Conjugate gaze: present    CN VII   Facial expression full, symmetric.     CN VIII   Hearing: intact    CN XII   CN XII normal.     Motor Exam   Muscle bulk: normal  Overall muscle tone: normal    Strength   Right biceps: 5/5  Left biceps: 5/5  Right triceps: 5/5  Left triceps:  "5/5  Right interossei: 5/5  Left interossei: 5/5  Right quadriceps: 5/5  Left quadriceps: 5/5  Right anterior tibial: 5/5  Left anterior tibial: 5/5  Right posterior tibial: 5/5  Left posterior tibial: 5/5    Sensory Exam   Light touch normal.     Gait, Coordination, and Reflexes     Gait  Gait: normal    Tremor   Resting tremor: absent  Action tremor: absent    Reflexes   Right brachioradialis: 2+  Left brachioradialis: 2+  Right biceps: 2+  Left biceps: 2+  Right triceps: 2+  Left triceps: 2+  Right patellar: 2+  Left patellar: 2+  Right achilles: 2+  Left achilles: 2+  Right : 2+  Left : 2+     Physical Exam        Vital Signs:   Vitals:    08/26/24 0930   BP: 110/72   Pulse: 70   SpO2: 99%   Weight: 72.2 kg (159 lb 3.2 oz)   Height: 177.8 cm (70\")     Body mass index is 22.84 kg/m².         Assessment / Plan      Assessment/Plan:   Diagnoses and all orders for this visit:    1. Seizure disorder  Comments:  Increase dose of Depakote  to 3 tabs q hs. Check labs.  Orders:  -     divalproex (Depakote ER) 500 MG 24 hr tablet; Take 3 tablets  Dispense: 90 tablet; Refill: 5  -     lacosamide (VIMPAT) 100 MG tablet tablet; Take 1 tablet by mouth Every 12 (Twelve) Hours.  Dispense: 60 tablet; Refill: 5    2. Seizure disorder  Comments:  no driving for 90 days. Add vimpat to depakote  Orders:  -     divalproex (Depakote ER) 500 MG 24 hr tablet; Take 3 tablets  Dispense: 90 tablet; Refill: 5  -     lacosamide (VIMPAT) 100 MG tablet tablet; Take 1 tablet by mouth Every 12 (Twelve) Hours.  Dispense: 60 tablet; Refill: 5       Labs next appt. CSC next appt.  Assessment & Plan  1. Seizure Disorder.  No seizures have been reported since the last visit. The current medication regimen appears to be effective without any noted side effects. Depakote 500 mg has been refilled. It is important to ensure that the medication is not missed to prevent seizure recurrence. Blood work will be conducted at the next visit to " monitor medication levels and overall health.    Follow-up  A follow-up visit is scheduled in 6 months.        Patient Education:       Reviewed medications, potential side effects and signs and symptoms to report. Discussed risk versus benefits of treatment plan with patient and/or family-including medications, labs and radiology that may be ordered. Addressed questions and concerns during visit. Patient and/or family verbalized understanding and agree with plan. Instructed to call the office with any questions and report to ER with any life-threatening symptoms.     Follow Up:   Return in about 6 months (around 2/26/2025).    During this visit the following were done:  Labs Reviewed []    Labs Ordered []    Radiology Reports Reviewed []    Radiology Ordered []    PCP Records Reviewed []    Referring Provider Records Reviewed []    ER Records Reviewed []    Hospital Records Reviewed []    History Obtained From Family []    Radiology Images Reviewed []    Other Reviewed [x]    Records Requested []      Patient or patient representative verbalized consent for the use of Ambient Listening during the visit with  Yamile Chester DNP, APRN for chart documentation. 8/26/2024  09:41 EDT      Yamile Chester DNP, APRN

## 2024-11-04 ENCOUNTER — OFFICE VISIT (OUTPATIENT)
Dept: NEUROLOGY | Facility: CLINIC | Age: 29
End: 2024-11-04
Payer: COMMERCIAL

## 2024-11-04 VITALS
OXYGEN SATURATION: 98 % | DIASTOLIC BLOOD PRESSURE: 74 MMHG | WEIGHT: 170.2 LBS | SYSTOLIC BLOOD PRESSURE: 116 MMHG | HEART RATE: 80 BPM | HEIGHT: 70 IN | BODY MASS INDEX: 24.37 KG/M2

## 2024-11-04 DIAGNOSIS — G44.209 TENSION-TYPE HEADACHE, NOT INTRACTABLE, UNSPECIFIED CHRONICITY PATTERN: ICD-10-CM

## 2024-11-04 DIAGNOSIS — S09.90XD TRAUMATIC INJURY OF HEAD, SUBSEQUENT ENCOUNTER: ICD-10-CM

## 2024-11-04 DIAGNOSIS — G40.909 SEIZURE DISORDER: Primary | ICD-10-CM

## 2024-11-04 PROCEDURE — 99213 OFFICE O/P EST LOW 20 MIN: CPT | Performed by: NURSE PRACTITIONER

## 2024-11-04 NOTE — LETTER
2024     Giuliana Lance MD  [address]  [address]  [facility] [address]    Patient: Meet Santos   YOB: 1995   Date of Visit: 2024     Dear Giuliana Lance MD:       Thank you for referring Meet Santos to me for evaluation. Below are the relevant portions of my assessment and plan of care.    If you have questions, please do not hesitate to call me. I look forward to following Meet along with you.         Sincerely,        Yamile Chester DNP, HALLIE        CC: No Recipients    Yamile Chester DNP, HALLIE  24 [id number]  Signed     Neuro Office Visit      Encounter Date: 2024   Patient Name: Meet Santos  : 1995   MRN: [id number]   PCP: Dr Lance  Chief Complaint:    Chief Complaint   Patient presents with   • Seizures       History of Present Illness: Meet Santos is a 29 y.o. male who is here today in Neurology for  seizures      Last visit 2024 w me-  History of Present Illness  The patient presents for evaluation of seizures. He is accompanied by an adult female.    He reports no side effects from his current medications. He denies any seizure activity, tremors, or unusual smells or tastes. He wakes up feeling sore, which he attributes to his job and regular gym workouts. His vision is clear and he has no difficulty swallowing. However, he does struggle with taking the 500 mg Depakote pill. He has experienced two headaches since returning from vacation last week, during which he lost his glasses.     Seizures  Pt seizure free since last visit. Compliant with meds. SE of lethargy. No other complaints.        Medication:Depakote  3q hs, Vimpat 100 bid  Compliance: rare missed dose  Side effects:none  Last seizure:May 18, 2024  Aura:dizziness  Triggers:not sleeping  Description:becomes rigid, no jerking, eye rolling, or tongue biting. No bladder changes, duration < 5 min, postictal     PH (copied  from last note)  First seizure in Feb 2021. Sitting at desk, felt dizzy, rigid and fell out of chair. No jerking. Eye rolling, no tongue biting. No bladder incontinence. Duration less than 5 minutes. Postictal. Did not go to ER  FAll 2023 had sz witnessed by daniela. No missed meds     4/16/24. Sz in parking lot at work. 3 minutes of being rigid with eye rolling and foaming. Sustained a head injury requiring staples. He had GI virus prior to episode. Mother explains he was not taking full dose of depakote at that time. CTH-neg in ER  Had a second seizure 5/18/24. Pt became rigid with eye rolling, foaming with 3-4 minute duration. No injury        MRI Brain With & Without Contrast (08/29/2022 18:12)-IMPRESSION:  Left periventricular heterotopic gray matter as on prior exam. No new  intracranial disease is seen.  MRI: nodular lesions left post ventricle     EEG: Left temporal sharp wavesEEG (Hospital Performed) (09/08/2021 08:30)        Meds: Keppra-irritability, Depakote ER 1500mg, Vimpat 100 bid        Headaches  Rare headaches. 2 in the last week but he had lost his glasses.  No further severe headaches. Symptoms relieved with IBU.  Days per month: 1-2/month  Location: left crown      Quality: Pressure        Duration: few hours  Severity: mild  Triggers:   Associated Symptoms: Photophobia and denies nausea, vomiting, phonophobia, vision changes  Aura: none        Abortives: IBU, sleep   Preventives: Depakote                                                                            PH  Onset 2022     Abnormal Eyelid movements  Abnormal eyelid movements have resolved. Very mild occasional slow horizontal nystagmus  Chronic left upper lid closes slowly at times. No vision changes. Recent eye exam is normal. Denies diplopia and eye pain.     PMH: seasonal allergies.Seizure disorder, spontaneous pneumothorax  Subjective      Past Medical History:   Past Medical History:   Diagnosis Date   • Seizures        Past  Surgical History:   Past Surgical History:   Procedure Laterality Date   • NO PAST SURGERIES         Family History:   Family History   Problem Relation Age of Onset   • No Known Problems Mother         RN   • Heart disease Father    • No Known Problems Brother    • Asthma Maternal Grandmother    • No Known Problems Maternal Grandfather        Social History:   Social History     Socioeconomic History   • Marital status: Single   Tobacco Use   • Smoking status: Every Day     Current packs/day: 1.00     Average packs/day: 1 pack/day for 5.0 years (5.0 ttl pk-yrs)     Types: Cigarettes     Passive exposure: Never   • Smokeless tobacco: Never   • Tobacco comments:     vaping currently    Vaping Use   • Vaping status: Every Day   • Substances: Nicotine, Flavoring   • Devices: Disposable   Substance and Sexual Activity   • Alcohol use: Not Currently   • Drug use: Yes     Types: Marijuana   • Sexual activity: Defer       Medications:     Current Outpatient Medications:   •  divalproex (Depakote ER) 500 MG 24 hr tablet, Take 3 tablets, Disp: 90 tablet, Rfl: 5  •  fluticasone (FLONASE) 50 MCG/ACT nasal spray, Administer 2 sprays into the nostril(s) as directed by provider As Needed., Disp: , Rfl:   •  lacosamide (VIMPAT) 100 MG tablet tablet, Take 1 tablet by mouth Every 12 (Twelve) Hours., Disp: 60 tablet, Rfl: 5    Allergies:   No Known Allergies    PHQ-9 Total Score:     STEADI Fall Risk Assessment has not been completed.    Objective     Physical Exam:   Physical Exam  Eyes:      Extraocular Movements: No nystagmus.   Neurological:      Motor: Motor strength is normal.     Coordination: Coordination is intact.      Deep Tendon Reflexes:      Reflex Scores:       Bicep reflexes are 2+ on the right side and 2+ on the left side.       Brachioradialis reflexes are 2+ on the right side and 2+ on the left side.       Patellar reflexes are 2+ on the right side and 2+ on the left side.       Achilles reflexes are 2+ on the  "right side and 2+ on the left side.  Psychiatric:         Speech: Speech normal.         Neurological Exam  Mental Status  Awake, alert and oriented to person, place and time. Recent and remote memory are intact. Speech is normal. Follows complex commands. Attention and concentration are normal. Fund of knowledge is appropriate for level of education.    Cranial Nerves  CN III, IV, VI: No nystagmus. Normal saccades. Normal smooth pursuit.   Right pupil: Round.   Left pupil: Round.  CN V: Facial sensation is normal.  CN VII: Full and symmetric facial movement.    Motor  Normal muscle bulk throughout. No fasciculations present. Normal muscle tone. No abnormal involuntary movements. Strength is 5/5 throughout all four extremities.    Sensory  Sensation is intact to light touch, pinprick, vibration and proprioception in all four extremities.    Reflexes                                            Right                      Left  Brachioradialis                    2+                         2+  Biceps                                 2+                         2+  Patellar                                2+                         2+  Achilles                                2+                         2+    Coordination    Finger-to-nose, rapid alternating movements and heel-to-shin normal bilaterally without dysmetria.    Gait  Normal casual, toe, heel and tandem gait.     Physical Exam        Vital Signs:   Vitals:    11/04/24 0900   BP: 116/74   Pulse: 80   SpO2: 98%   Weight: 77.2 kg (170 lb 3.2 oz)   Height: 177.8 cm (70\")     Body mass index is 24.42 kg/m².         Assessment / Plan      Assessment/Plan:   Diagnoses and all orders for this visit:    1. Seizure disorder (Primary)  Comments:  Cont Depakote er 500 3 q hs and Vimpat 100 bid    2. Traumatic injury of head, subsequent encounter    3. Tension-type headache, not intractable, unspecified chronicity pattern       Assessment & Plan  1. Seizures.  He is currently " taking Depakote 500 mg and Vimpat (lacosamide) without significant side effects. He reports difficulty swallowing the Depakote tablets. He was advised to try consuming yogurt or pudding before taking the medication to facilitate swallowing. He was also instructed to consult with his pharmacist regarding the availability of smaller-sized Depakote tablets. If he finds it impossible to swallow the Depakote tablet, a liquid form of the medication will be considered. He was advised to continue his current medication regimen to avoid the risk of seizures.    Follow-up  Return in 6 months for follow up.        Patient Education:       Reviewed medications, potential side effects and signs and symptoms to report. Discussed risk versus benefits of treatment plan with patient and/or family-including medications, labs and radiology that may be ordered. Addressed questions and concerns during visit. Patient and/or family verbalized understanding and agree with plan. Instructed to call the office with any questions and report to ER with any life-threatening symptoms.     Follow Up:   Return in about 6 months (around 5/4/2025) for Recheck.    During this visit the following were done:  Labs Reviewed []    Labs Ordered []    Radiology Reports Reviewed []    Radiology Ordered []    PCP Records Reviewed []    Referring Provider Records Reviewed []    ER Records Reviewed []    Hospital Records Reviewed []    History Obtained From Family [x]    Radiology Images Reviewed []    Other Reviewed [x]    Records Requested []      Patient or patient representative verbalized consent for the use of Ambient Listening during the visit with  Yamile Chester DNP, HALLIE for chart documentation. 11/4/2024  08:40 EST      Yamile Chester DNP, APRN

## 2024-11-04 NOTE — PROGRESS NOTES
Neuro Office Visit      Encounter Date: 2024   Patient Name: Meet Santos  : 1995   MRN: [id number]   PCP: Dr Lance  Chief Complaint:    Chief Complaint   Patient presents with    Seizures       History of Present Illness: Meet Santos is a 29 y.o. male who is here today in Neurology for  seizures      Last visit 2024 w me-  History of Present Illness  The patient presents for evaluation of seizures. He is accompanied by an adult female.    He reports no side effects from his current medications. He denies any seizure activity, tremors, or unusual smells or tastes. He wakes up feeling sore, which he attributes to his job and regular gym workouts. His vision is clear and he has no difficulty swallowing. However, he does struggle with taking the 500 mg Depakote pill. He has experienced two headaches since returning from vacation last week, during which he lost his glasses.     Seizures  Pt seizure free since last visit. Compliant with meds. SE of lethargy. No other complaints.        Medication:Depakote  3q hs, Vimpat 100 bid  Compliance: rare missed dose  Side effects:none  Last seizure:May 18, 2024  Aura:dizziness  Triggers:not sleeping  Description:becomes rigid, no jerking, eye rolling, or tongue biting. No bladder changes, duration < 5 min, postictal     PH (copied from last note)  First seizure in 2021. Sitting at desk, felt dizzy, rigid and fell out of chair. No jerking. Eye rolling, no tongue biting. No bladder incontinence. Duration less than 5 minutes. Postictal. Did not go to ER  FAll  had sz witnessed by daniela. No missed meds     24. Sz in parking lot at work. 3 minutes of being rigid with eye rolling and foaming. Sustained a head injury requiring staples. He had GI virus prior to episode. Mother explains he was not taking full dose of depakote at that time. CTH-neg in ER  Had a second seizure 24. Pt became rigid with eye rolling,  foaming with 3-4 minute duration. No injury        MRI Brain With & Without Contrast (08/29/2022 18:12)-IMPRESSION:  Left periventricular heterotopic gray matter as on prior exam. No new  intracranial disease is seen.  MRI: nodular lesions left post ventricle     EEG: Left temporal sharp wavesEEG (Hospital Performed) (09/08/2021 08:30)        Meds: Keppra-irritability, Depakote ER 1500mg, Vimpat 100 bid        Headaches  Rare headaches. 2 in the last week but he had lost his glasses.  No further severe headaches. Symptoms relieved with IBU.  Days per month: 1-2/month  Location: left crown      Quality: Pressure        Duration: few hours  Severity: mild  Triggers:   Associated Symptoms: Photophobia and denies nausea, vomiting, phonophobia, vision changes  Aura: none        Abortives: IBU, sleep   Preventives: Depakote                                                                            PH  Onset 2022     Abnormal Eyelid movements  Abnormal eyelid movements have resolved. Very mild occasional slow horizontal nystagmus  Chronic left upper lid closes slowly at times. No vision changes. Recent eye exam is normal. Denies diplopia and eye pain.     PMH: seasonal allergies.Seizure disorder, spontaneous pneumothorax  Subjective      Past Medical History:   Past Medical History:   Diagnosis Date    Seizures        Past Surgical History:   Past Surgical History:   Procedure Laterality Date    NO PAST SURGERIES         Family History:   Family History   Problem Relation Age of Onset    No Known Problems Mother         RN    Heart disease Father     No Known Problems Brother     Asthma Maternal Grandmother     No Known Problems Maternal Grandfather        Social History:   Social History     Socioeconomic History    Marital status: Single   Tobacco Use    Smoking status: Every Day     Current packs/day: 1.00     Average packs/day: 1 pack/day for 5.0 years (5.0 ttl pk-yrs)     Types: Cigarettes     Passive exposure: Never     Smokeless tobacco: Never    Tobacco comments:     vaping currently    Vaping Use    Vaping status: Every Day    Substances: Nicotine, Flavoring    Devices: Disposable   Substance and Sexual Activity    Alcohol use: Not Currently    Drug use: Yes     Types: Marijuana    Sexual activity: Defer       Medications:     Current Outpatient Medications:     divalproex (Depakote ER) 500 MG 24 hr tablet, Take 3 tablets, Disp: 90 tablet, Rfl: 5    fluticasone (FLONASE) 50 MCG/ACT nasal spray, Administer 2 sprays into the nostril(s) as directed by provider As Needed., Disp: , Rfl:     lacosamide (VIMPAT) 100 MG tablet tablet, Take 1 tablet by mouth Every 12 (Twelve) Hours., Disp: 60 tablet, Rfl: 5    Allergies:   No Known Allergies    PHQ-9 Total Score:     STEADI Fall Risk Assessment has not been completed.    Objective     Physical Exam:   Physical Exam  Eyes:      Extraocular Movements: No nystagmus.   Neurological:      Motor: Motor strength is normal.     Coordination: Coordination is intact.      Deep Tendon Reflexes:      Reflex Scores:       Bicep reflexes are 2+ on the right side and 2+ on the left side.       Brachioradialis reflexes are 2+ on the right side and 2+ on the left side.       Patellar reflexes are 2+ on the right side and 2+ on the left side.       Achilles reflexes are 2+ on the right side and 2+ on the left side.  Psychiatric:         Speech: Speech normal.         Neurological Exam  Mental Status  Awake, alert and oriented to person, place and time. Recent and remote memory are intact. Speech is normal. Follows complex commands. Attention and concentration are normal. Fund of knowledge is appropriate for level of education.    Cranial Nerves  CN III, IV, VI: No nystagmus. Normal saccades. Normal smooth pursuit.   Right pupil: Round.   Left pupil: Round.  CN V: Facial sensation is normal.  CN VII: Full and symmetric facial movement.    Motor  Normal muscle bulk throughout. No fasciculations present.  "Normal muscle tone. No abnormal involuntary movements. Strength is 5/5 throughout all four extremities.    Sensory  Sensation is intact to light touch, pinprick, vibration and proprioception in all four extremities.    Reflexes                                            Right                      Left  Brachioradialis                    2+                         2+  Biceps                                 2+                         2+  Patellar                                2+                         2+  Achilles                                2+                         2+    Coordination    Finger-to-nose, rapid alternating movements and heel-to-shin normal bilaterally without dysmetria.    Gait  Normal casual, toe, heel and tandem gait.     Physical Exam        Vital Signs:   Vitals:    11/04/24 0900   BP: 116/74   Pulse: 80   SpO2: 98%   Weight: 77.2 kg (170 lb 3.2 oz)   Height: 177.8 cm (70\")     Body mass index is 24.42 kg/m².         Assessment / Plan      Assessment/Plan:   Diagnoses and all orders for this visit:    1. Seizure disorder (Primary)  Comments:  Cont Depakote er 500 3 q hs and Vimpat 100 bid    2. Traumatic injury of head, subsequent encounter    3. Tension-type headache, not intractable, unspecified chronicity pattern       Assessment & Plan  1. Seizures.  He is currently taking Depakote 500 mg and Vimpat (lacosamide) without significant side effects. He reports difficulty swallowing the Depakote tablets. He was advised to try consuming yogurt or pudding before taking the medication to facilitate swallowing. He was also instructed to consult with his pharmacist regarding the availability of smaller-sized Depakote tablets. If he finds it impossible to swallow the Depakote tablet, a liquid form of the medication will be considered. He was advised to continue his current medication regimen to avoid the risk of seizures.    Follow-up  Return in 6 months for follow up.        Patient Education: "       Reviewed medications, potential side effects and signs and symptoms to report. Discussed risk versus benefits of treatment plan with patient and/or family-including medications, labs and radiology that may be ordered. Addressed questions and concerns during visit. Patient and/or family verbalized understanding and agree with plan. Instructed to call the office with any questions and report to ER with any life-threatening symptoms.     Follow Up:   Return in about 6 months (around 5/4/2025) for Recheck.    During this visit the following were done:  Labs Reviewed []    Labs Ordered []    Radiology Reports Reviewed []    Radiology Ordered []    PCP Records Reviewed []    Referring Provider Records Reviewed []    ER Records Reviewed []    Hospital Records Reviewed []    History Obtained From Family [x]    Radiology Images Reviewed []    Other Reviewed [x]    Records Requested []      Patient or patient representative verbalized consent for the use of Ambient Listening during the visit with  Yamile Chester DNP, APRN for chart documentation. 11/4/2024  08:40 EST      Yamile Chester DNP, APRN

## 2025-03-11 DIAGNOSIS — G40.909 SEIZURE DISORDER: ICD-10-CM

## 2025-03-11 NOTE — TELEPHONE ENCOUNTER
Rx Refill Note  Requested Prescriptions     Pending Prescriptions Disp Refills    lacosamide (VIMPAT) 100 MG tablet tablet [Pharmacy Med Name: Lacosamide 100 MG Oral Tablet] 60 tablet 0     Sig: TAKE 1 TABLET BY MOUTH EVERY 12 HOURS      Last filled: 8/26.24 60 with 5 refills.  Last office visit with prescribing clinician: 11/4/2024      Next office visit with prescribing clinician: 5/5/2025         Tsering Ackerman MA  03/11/25, 16:16 EDT

## 2025-03-11 NOTE — TELEPHONE ENCOUNTER
Provider: MARIANA HOGUE    Caller: Meet Santos    Relationship to Patient: Self    Pharmacy: [facility]     Phone Number: [phone]    Reason for Call: PT IS CALLING REGARDING HIS LACOSAMIDE REFILL.  HE ONLY HAS 2 DAYS LEFT OF THIS MEDICATION.      PLEASE REVIEW AND ADVISE     THANK YOU

## 2025-03-12 RX ORDER — LACOSAMIDE 100 MG/1
100 TABLET ORAL EVERY 12 HOURS SCHEDULED
Qty: 60 TABLET | Refills: 1 | Status: SHIPPED | OUTPATIENT
Start: 2025-03-12

## 2025-03-24 DIAGNOSIS — G40.909 SEIZURE DISORDER: ICD-10-CM

## 2025-03-25 RX ORDER — DIVALPROEX SODIUM 500 MG/1
1500 TABLET, FILM COATED, EXTENDED RELEASE ORAL DAILY
Qty: 90 TABLET | Refills: 1 | Status: SHIPPED | OUTPATIENT
Start: 2025-03-25

## 2025-03-25 NOTE — TELEPHONE ENCOUNTER
Rx Refill Note  Requested Prescriptions     Pending Prescriptions Disp Refills    divalproex (DEPAKOTE ER) 500 MG 24 hr tablet [Pharmacy Med Name: Divalproex Sodium  MG Oral Tablet Extended Release 24 Hour] 90 tablet 0     Sig: Take 3 tablets by mouth once daily      Last filled: 8/26/24 90 with 5 refills.  Last office visit with prescribing clinician: 11/4/2024      Next office visit with prescribing clinician: 5/5/2025     Sent in 90 with 1 refill.    Tsering Ackerman MA  03/25/25, 08:03 EDT

## 2025-04-14 NOTE — TELEPHONE ENCOUNTER
All labs are visible for the patient to view on [facility].     I looked in the labs section in Epic they are visible for the patient to see. If he is new to [facility] it may take time for everything to populate.   
Caller: RONA    Relationship: MOM     Best call back number: [phone]    What was the call regarding: MY CHART LABS     Do you require a callback: YES IF YOU CAN'T GET THEM IN CHART.     PT WANTS HIS LABS RELEASED TO HIS MY CHART. HE CALLED THE NUMBER FOR MY CHART AND THAT IS WHAT HE WAS INSTRUCTED TO DO AS THEY ARE NOT IN THERE.     PLEASE  ADVISE     
No

## 2025-05-05 ENCOUNTER — OFFICE VISIT (OUTPATIENT)
Dept: NEUROLOGY | Facility: CLINIC | Age: 30
End: 2025-05-05
Payer: COMMERCIAL

## 2025-05-05 VITALS
BODY MASS INDEX: 23.65 KG/M2 | OXYGEN SATURATION: 99 % | WEIGHT: 165.2 LBS | DIASTOLIC BLOOD PRESSURE: 74 MMHG | HEART RATE: 80 BPM | HEIGHT: 70 IN | SYSTOLIC BLOOD PRESSURE: 110 MMHG

## 2025-05-05 DIAGNOSIS — G44.209 TENSION-TYPE HEADACHE, NOT INTRACTABLE, UNSPECIFIED CHRONICITY PATTERN: ICD-10-CM

## 2025-05-05 DIAGNOSIS — G40.909 SEIZURE DISORDER: Primary | ICD-10-CM

## 2025-05-05 DIAGNOSIS — Z79.899 CONTROLLED SUBSTANCE AGREEMENT SIGNED: ICD-10-CM

## 2025-05-05 PROCEDURE — 99214 OFFICE O/P EST MOD 30 MIN: CPT | Performed by: NURSE PRACTITIONER

## 2025-05-05 RX ORDER — LACOSAMIDE 100 MG/1
100 TABLET ORAL EVERY 12 HOURS SCHEDULED
Qty: 180 TABLET | Refills: 1 | Status: SHIPPED | OUTPATIENT
Start: 2025-05-05

## 2025-05-05 RX ORDER — CEFDINIR 300 MG/1
1 CAPSULE ORAL EVERY 12 HOURS SCHEDULED
COMMUNITY
Start: 2025-05-02

## 2025-05-05 RX ORDER — DIVALPROEX SODIUM 500 MG/1
1500 TABLET, FILM COATED, EXTENDED RELEASE ORAL DAILY
Qty: 270 TABLET | Refills: 1 | Status: SHIPPED | OUTPATIENT
Start: 2025-05-05

## 2025-05-05 NOTE — LETTER
May 5, 2025     Giuliana Lance MD  [address] [facility] [address]  [place] [address]    Patient: Meet Santos   YOB: 1995   Date of Visit: 2025     Dear Giuliana Lance MD:       Thank you for referring Meet Santos to me for evaluation. Below are the relevant portions of my assessment and plan of care.    If you have questions, please do not hesitate to call me. I look forward to following Mete along with you.         Sincerely,        Yamile Chester DNP, APRANGIE        CC: No Recipients    Yamile Chester DNP, HALLIE  25 [id number]  Signed     Neuro Office Visit      Encounter Date: 2025   Patient Name: Meet Santos  : 1995   MRN: [id number]   PCP: DR Lance  Chief Complaint:    Chief Complaint   Patient presents with   • Seizures       History of Present Illness: Meet Santos is a 30 y.o. male who is here today in Neurology for  seizure, headache and traumatic head injury    Last visit 2024-Depakote  3 q hs w Vimpat 100 bid    History of Present Illness       He reports no side effects from his current medications. He denies any seizure activity, tremors, or unusual smells or tastes. He wakes up feeling sore, which he attributes to his job and regular gym workouts. His vision is clear and he has no difficulty swallowing. However, he does struggle with taking the 500 mg Depakote pill. He has experienced two headaches since returning from vacation last week, during which he lost his glasses.     Seizures  Pt seizure free since last visit. Compliant with meds. SE of lethargy. No other complaints.  Sick recently. Girlfriend reports he will randomly sit up in bed and has a change in breathing but no usual seizure like activity.        Medication:Depakote  3q hs, Vimpat 100 bid  Compliance: rare missed dose  Side effects:none  Last seizure:May 18, 2024  Aura:dizziness  Triggers:not sleeping  Description:becomes rigid,  no jerking, eye rolling, or tongue biting. No bladder changes, duration < 5 min, postictal     PH (copied from last note)  First seizure in Feb 2021. Sitting at desk, felt dizzy, rigid and fell out of chair. No jerking. Eye rolling, no tongue biting. No bladder incontinence. Duration less than 5 minutes. Postictal. Did not go to ER  FAll 2023 had sz witnessed by fiance. No missed meds     4/16/24. Sz in parking lot at work. 3 minutes of being rigid with eye rolling and foaming. Sustained a head injury requiring staples. He had GI virus prior to episode. Mother explains he was not taking full dose of depakote at that time. CTH-neg in ER  Had a second seizure 5/18/24. Pt became rigid with eye rolling, foaming with 3-4 minute duration. No injury        MRI Brain With & Without Contrast (08/29/2022 18:12)-IMPRESSION:  Left periventricular heterotopic gray matter as on prior exam. No new  intracranial disease is seen.  MRI: nodular lesions left post ventricle     EEG: Left temporal sharp wavesEEG (Hospital Performed) (09/08/2021 08:30)        Meds: Keppra-irritability, Depakote ER 1500mg, Vimpat 100 bid        Headaches  Rare headaches. 2 in the last week but he had lost his glasses.  No further severe headaches. Symptoms relieved with IBU.  Days per month: 1-2/month  Location: left [facility]      Quality: Pressure        Duration: few hours  Severity: mild  Triggers:   Associated Symptoms: Photophobia and denies nausea, vomiting, phonophobia, vision changes  Aura: none        Abortives: IBU, sleep   Preventives: Depakote                                                                            PH  Onset 2022     Abnormal Eyelid movements  Abnormal eyelid movements have resolved. Very mild occasional slow horizontal nystagmus  Chronic left upper lid closes slowly at times. No vision changes. Recent eye exam is normal. Denies diplopia and eye pain.     PMH: seasonal allergies.Seizure disorder, spontaneous  pneumothorax  Subjective      Past Medical History:   Past Medical History:   Diagnosis Date   • Seizures        Past Surgical History:   Past Surgical History:   Procedure Laterality Date   • NO PAST SURGERIES         Family History:   Family History   Problem Relation Age of Onset   • No Known Problems Mother         RN   • Heart disease Father    • No Known Problems Brother    • Asthma Maternal Grandmother    • No Known Problems Maternal Grandfather        Social History:   Social History     Socioeconomic History   • Marital status: Single   Tobacco Use   • Smoking status: Every Day     Current packs/day: 1.00     Average packs/day: 1 pack/day for 5.0 years (5.0 ttl pk-yrs)     Types: Cigarettes     Passive exposure: Never   • Smokeless tobacco: Never   • Tobacco comments:     vaping currently    Vaping Use   • Vaping status: Every Day   • Substances: Nicotine, Flavoring   • Devices: Disposable   Substance and Sexual Activity   • Alcohol use: Not Currently   • Drug use: Yes     Types: Marijuana   • Sexual activity: Defer       Medications:     Current Outpatient Medications:   •  cefdinir (OMNICEF) 300 MG capsule, Take 1 capsule by mouth Every 12 (Twelve) Hours., Disp: , Rfl:   •  divalproex (DEPAKOTE ER) 500 MG 24 hr tablet, Take 3 tablets by mouth Daily., Disp: 270 tablet, Rfl: 1  •  fluticasone (FLONASE) 50 MCG/ACT nasal spray, Administer 2 sprays into the nostril(s) as directed by provider As Needed., Disp: , Rfl:   •  lacosamide (VIMPAT) 100 MG tablet tablet, Take 1 tablet by mouth Every 12 (Twelve) Hours., Disp: 180 tablet, Rfl: 1    Allergies:   No Known Allergies    PHQ-9 Total Score:     STEADI Fall Risk Assessment has not been completed.    Objective     Physical Exam:   Physical Exam  Eyes:      General: Lids are normal.      Extraocular Movements: EOM normal. No nystagmus.   Neurological:      Coordination: Romberg sign negative.      Deep Tendon Reflexes:      Reflex Scores:       Bicep reflexes are  2+ on the right side and 2+ on the left side.       Brachioradialis reflexes are 2+ on the right side and 2+ on the left side.       Patellar reflexes are 2+ on the right side and 2+ on the left side.       Achilles reflexes are 2+ on the right side and 2+ on the left side.  Psychiatric:         Speech: Speech normal.         Neurological Exam  Mental Status  Awake, alert and oriented to person, place and time. Recent and remote memory are intact. Speech is normal. Follows three-step commands. Attention and concentration are normal. Fund of knowledge is appropriate for level of education.    Cranial Nerves  CN II: Right visual acuity: Finger movement. Left visual acuity: Finger movement. Right normal visual field. Left normal visual field.  CN III, IV, VI: Extraocular movements intact bilaterally. No nystagmus. Normal saccades. Normal lids and orbits bilaterally.   Right pupil: Round.   Left pupil: Round.  CN V: Facial sensation is normal.  CN VII: Full and symmetric facial movement.  CN IX, X: Palate elevates symmetrically  CN XI: Shoulder shrug strength is normal.  CN XII: Tongue midline without atrophy or fasciculations.    Motor  Normal muscle bulk throughout. No fasciculations present. Normal muscle tone. No abnormal involuntary movements. Strength is 5/5 in all four extremities except as noted. No pronator drift.    Sensory  Sensation is intact to light touch, pinprick, vibration and proprioception in all four extremities.    Reflexes                                            Right                      Left  Brachioradialis                    2+                         2+  Biceps                                 2+                         2+  Patellar                                2+                         2+  Achilles                                2+                         2+    Right pathological reflexes: Ankle clonus absent.  Left pathological reflexes: Ankle clonus absent.    Coordination  Right:  "Finger-to-nose normal. Rapid alternating movement normal. Heel-to-shin normal.    Gait  Normal casual, toe, heel and tandem gait. Romberg is absent. Able to rise from chair without using arms.     Physical Exam        Vital Signs:   Vitals:    05/05/25 0855   BP: 110/74   Pulse: 80   SpO2: 99%   Weight: 74.9 kg (165 lb 3.2 oz)   Height: 177.8 cm (70\")     Body mass index is 23.7 kg/m².         Assessment / Plan      Assessment/Plan:   Diagnoses and all orders for this visit:    1. Seizure disorder (Primary)  Comments:  Cont vimpat and depakote  Orders:  -     divalproex (DEPAKOTE ER) 500 MG 24 hr tablet; Take 3 tablets by mouth Daily.  Dispense: 270 tablet; Refill: 1  -     lacosamide (VIMPAT) 100 MG tablet tablet; Take 1 tablet by mouth Every 12 (Twelve) Hours.  Dispense: 180 tablet; Refill: 1    2. Tension-type headache, not intractable, unspecified chronicity pattern  Comments:  resolved    3. Controlled substance agreement signed       Assessment & Plan     As a part of this patient's therapy a controlled substance was prescribed. Instructed on the safe and proper use of this medication along with potential risks. Controlled substance contract signed and scanned. Ramon will be reviewed and UDS obtained as indicated.        Patient Education:       Reviewed medications, potential side effects and signs and symptoms to report. Discussed risk versus benefits of treatment plan with patient and/or family-including medications, labs and radiology that may be ordered. Addressed questions and concerns during visit. Patient and/or family verbalized understanding and agree with plan. Instructed to call the office with any questions and report to ER with any life-threatening symptoms.     Follow Up:   Return in about 6 months (around 11/5/2025).    During this visit the following were done:  Labs Reviewed [x]    Labs Ordered []    Radiology Reports Reviewed []    Radiology Ordered []    PCP Records Reviewed []  "   Referring Provider Records Reviewed []    ER Records Reviewed []    Hospital Records Reviewed []    History Obtained From Family [x]    Radiology Images Reviewed []    Other Reviewed [x]    Records Requested []      Patient or patient representative verbalized consent for the use of Ambient Listening during the visit with  Yamile Chester DNP, APRN for chart documentation. 5/5/2025  08:08 EDT      Yamile Chetser DNP, APRN

## 2025-05-05 NOTE — PROGRESS NOTES
Neuro Office Visit      Encounter Date: 2025   Patient Name: Meet Santos  : 1995   MRN: [id number]   PCP: DR Lance  Chief Complaint:    Chief Complaint   Patient presents with    Seizures       History of Present Illness: Meet Santos is a 30 y.o. male who is here today in Neurology for  seizure, headache and traumatic head injury    Last visit 2024-Depakote  3 q hs w Vimpat 100 bid    History of Present Illness       He reports no side effects from his current medications. He denies any seizure activity, tremors, or unusual smells or tastes. He wakes up feeling sore, which he attributes to his job and regular gym workouts. His vision is clear and he has no difficulty swallowing. However, he does struggle with taking the 500 mg Depakote pill. He has experienced two headaches since returning from vacation last week, during which he lost his glasses.     Seizures  Pt seizure free since last visit. Compliant with meds. SE of lethargy. No other complaints.  Sick recently. Girlfriend reports he will randomly sit up in bed and has a change in breathing but no usual seizure like activity.        Medication:Depakote  3q hs, Vimpat 100 bid  Compliance: rare missed dose  Side effects:none  Last seizure:May 18, 2024  Aura:dizziness  Triggers:not sleeping  Description:becomes rigid, no jerking, eye rolling, or tongue biting. No bladder changes, duration < 5 min, postictal     PH (copied from last note)  First seizure in 2021. Sitting at desk, felt dizzy, rigid and fell out of chair. No jerking. Eye rolling, no tongue biting. No bladder incontinence. Duration less than 5 minutes. Postictal. Did not go to ER  FAll  had sz witnessed by daniela. No missed meds     24. Sz in parking lot at work. 3 minutes of being rigid with eye rolling and foaming. Sustained a head injury requiring staples. He had GI virus prior to episode. Mother explains he was not taking  full dose of depakote at that time. CTH-neg in ER  Had a second seizure 5/18/24. Pt became rigid with eye rolling, foaming with 3-4 minute duration. No injury        MRI Brain With & Without Contrast (08/29/2022 18:12)-IMPRESSION:  Left periventricular heterotopic gray matter as on prior exam. No new  intracranial disease is seen.  MRI: nodular lesions left post ventricle     EEG: Left temporal sharp wavesEEG (Hospital Performed) (09/08/2021 08:30)        Meds: Keppra-irritability, Depakote ER 1500mg, Vimpat 100 bid        Headaches  Rare headaches. 2 in the last week but he had lost his glasses.  No further severe headaches. Symptoms relieved with IBU.  Days per month: 1-2/month  Location: left crown      Quality: Pressure        Duration: few hours  Severity: mild  Triggers:   Associated Symptoms: Photophobia and denies nausea, vomiting, phonophobia, vision changes  Aura: none        Abortives: IBU, sleep   Preventives: Depakote                                                                            PH  Onset 2022     Abnormal Eyelid movements  Abnormal eyelid movements have resolved. Very mild occasional slow horizontal nystagmus  Chronic left upper lid closes slowly at times. No vision changes. Recent eye exam is normal. Denies diplopia and eye pain.     PMH: seasonal allergies.Seizure disorder, spontaneous pneumothorax  Subjective      Past Medical History:   Past Medical History:   Diagnosis Date    Seizures        Past Surgical History:   Past Surgical History:   Procedure Laterality Date    NO PAST SURGERIES         Family History:   Family History   Problem Relation Age of Onset    No Known Problems Mother         RN    Heart disease Father     No Known Problems Brother     Asthma Maternal Grandmother     No Known Problems Maternal Grandfather        Social History:   Social History     Socioeconomic History    Marital status: Single   Tobacco Use    Smoking status: Every Day     Current packs/day: 1.00      Average packs/day: 1 pack/day for 5.0 years (5.0 ttl pk-yrs)     Types: Cigarettes     Passive exposure: Never    Smokeless tobacco: Never    Tobacco comments:     vaping currently    Vaping Use    Vaping status: Every Day    Substances: Nicotine, Flavoring    Devices: Disposable   Substance and Sexual Activity    Alcohol use: Not Currently    Drug use: Yes     Types: Marijuana    Sexual activity: Defer       Medications:     Current Outpatient Medications:     cefdinir (OMNICEF) 300 MG capsule, Take 1 capsule by mouth Every 12 (Twelve) Hours., Disp: , Rfl:     divalproex (DEPAKOTE ER) 500 MG 24 hr tablet, Take 3 tablets by mouth Daily., Disp: 270 tablet, Rfl: 1    fluticasone (FLONASE) 50 MCG/ACT nasal spray, Administer 2 sprays into the nostril(s) as directed by provider As Needed., Disp: , Rfl:     lacosamide (VIMPAT) 100 MG tablet tablet, Take 1 tablet by mouth Every 12 (Twelve) Hours., Disp: 180 tablet, Rfl: 1    Allergies:   No Known Allergies    PHQ-9 Total Score:     STEADI Fall Risk Assessment has not been completed.    Objective     Physical Exam:   Physical Exam  Eyes:      General: Lids are normal.      Extraocular Movements: EOM normal. No nystagmus.   Neurological:      Coordination: Romberg sign negative.      Deep Tendon Reflexes:      Reflex Scores:       Bicep reflexes are 2+ on the right side and 2+ on the left side.       Brachioradialis reflexes are 2+ on the right side and 2+ on the left side.       Patellar reflexes are 2+ on the right side and 2+ on the left side.       Achilles reflexes are 2+ on the right side and 2+ on the left side.  Psychiatric:         Speech: Speech normal.         Neurological Exam  Mental Status  Awake, alert and oriented to person, place and time. Recent and remote memory are intact. Speech is normal. Follows three-step commands. Attention and concentration are normal. Fund of knowledge is appropriate for level of education.    Cranial Nerves  CN II: Right  "visual acuity: Finger movement. Left visual acuity: Finger movement. Right normal visual field. Left normal visual field.  CN III, IV, VI: Extraocular movements intact bilaterally. No nystagmus. Normal saccades. Normal lids and orbits bilaterally.   Right pupil: Round.   Left pupil: Round.  CN V: Facial sensation is normal.  CN VII: Full and symmetric facial movement.  CN IX, X: Palate elevates symmetrically  CN XI: Shoulder shrug strength is normal.  CN XII: Tongue midline without atrophy or fasciculations.    Motor  Normal muscle bulk throughout. No fasciculations present. Normal muscle tone. No abnormal involuntary movements. Strength is 5/5 in all four extremities except as noted. No pronator drift.    Sensory  Sensation is intact to light touch, pinprick, vibration and proprioception in all four extremities.    Reflexes                                            Right                      Left  Brachioradialis                    2+                         2+  Biceps                                 2+                         2+  Patellar                                2+                         2+  Achilles                                2+                         2+    Right pathological reflexes: Ankle clonus absent.  Left pathological reflexes: Ankle clonus absent.    Coordination  Right: Finger-to-nose normal. Rapid alternating movement normal. Heel-to-shin normal.    Gait  Normal casual, toe, heel and tandem gait. Romberg is absent. Able to rise from chair without using arms.     Physical Exam        Vital Signs:   Vitals:    05/05/25 0855   BP: 110/74   Pulse: 80   SpO2: 99%   Weight: 74.9 kg (165 lb 3.2 oz)   Height: 177.8 cm (70\")     Body mass index is 23.7 kg/m².         Assessment / Plan      Assessment/Plan:   Diagnoses and all orders for this visit:    1. Seizure disorder (Primary)  Comments:  Cont vimpat and depakote  Orders:  -     divalproex (DEPAKOTE ER) 500 MG 24 hr tablet; Take 3 tablets by " mouth Daily.  Dispense: 270 tablet; Refill: 1  -     lacosamide (VIMPAT) 100 MG tablet tablet; Take 1 tablet by mouth Every 12 (Twelve) Hours.  Dispense: 180 tablet; Refill: 1    2. Tension-type headache, not intractable, unspecified chronicity pattern  Comments:  resolved    3. Controlled substance agreement signed       Assessment & Plan     As a part of this patient's therapy a controlled substance was prescribed. Instructed on the safe and proper use of this medication along with potential risks. Controlled substance contract signed and scanned. Ramon will be reviewed and UDS obtained as indicated.        Patient Education:       Reviewed medications, potential side effects and signs and symptoms to report. Discussed risk versus benefits of treatment plan with patient and/or family-including medications, labs and radiology that may be ordered. Addressed questions and concerns during visit. Patient and/or family verbalized understanding and agree with plan. Instructed to call the office with any questions and report to ER with any life-threatening symptoms.     Follow Up:   Return in about 6 months (around 11/5/2025).    During this visit the following were done:  Labs Reviewed [x]    Labs Ordered []    Radiology Reports Reviewed []    Radiology Ordered []    PCP Records Reviewed []    Referring Provider Records Reviewed []    ER Records Reviewed []    Hospital Records Reviewed []    History Obtained From Family [x]    Radiology Images Reviewed []    Other Reviewed [x]    Records Requested []      Patient or patient representative verbalized consent for the use of Ambient Listening during the visit with  Yamile Chester DNP, APRANGIE for chart documentation. 5/5/2025  08:08 EDT      Yamile Chester DNP, APRN